# Patient Record
Sex: FEMALE | Race: WHITE | Employment: FULL TIME | URBAN - METROPOLITAN AREA
[De-identification: names, ages, dates, MRNs, and addresses within clinical notes are randomized per-mention and may not be internally consistent; named-entity substitution may affect disease eponyms.]

---

## 2018-03-12 ENCOUNTER — OFFICE VISIT (OUTPATIENT)
Dept: PRIMARY CARE CLINIC | Age: 48
End: 2018-03-12
Payer: COMMERCIAL

## 2018-03-12 VITALS
WEIGHT: 138 LBS | OXYGEN SATURATION: 98 % | BODY MASS INDEX: 20.92 KG/M2 | HEIGHT: 68 IN | DIASTOLIC BLOOD PRESSURE: 72 MMHG | HEART RATE: 62 BPM | SYSTOLIC BLOOD PRESSURE: 128 MMHG | TEMPERATURE: 97.7 F

## 2018-03-12 DIAGNOSIS — J32.4 CHRONIC PANSINUSITIS: Primary | ICD-10-CM

## 2018-03-12 PROCEDURE — G8427 DOCREV CUR MEDS BY ELIG CLIN: HCPCS | Performed by: FAMILY MEDICINE

## 2018-03-12 PROCEDURE — 1036F TOBACCO NON-USER: CPT | Performed by: FAMILY MEDICINE

## 2018-03-12 PROCEDURE — 99213 OFFICE O/P EST LOW 20 MIN: CPT | Performed by: FAMILY MEDICINE

## 2018-03-12 PROCEDURE — G8420 CALC BMI NORM PARAMETERS: HCPCS | Performed by: FAMILY MEDICINE

## 2018-03-12 PROCEDURE — G8484 FLU IMMUNIZE NO ADMIN: HCPCS | Performed by: FAMILY MEDICINE

## 2018-03-12 RX ORDER — FLUTICASONE PROPIONATE 50 MCG
2 SPRAY, SUSPENSION (ML) NASAL DAILY
Qty: 1 BOTTLE | Refills: 1 | Status: SHIPPED | OUTPATIENT
Start: 2018-03-12 | End: 2018-05-14 | Stop reason: SDUPTHER

## 2018-03-12 ASSESSMENT — PATIENT HEALTH QUESTIONNAIRE - PHQ9
2. FEELING DOWN, DEPRESSED OR HOPELESS: 0
1. LITTLE INTEREST OR PLEASURE IN DOING THINGS: 0
SUM OF ALL RESPONSES TO PHQ9 QUESTIONS 1 & 2: 0
SUM OF ALL RESPONSES TO PHQ QUESTIONS 1-9: 0

## 2018-03-12 ASSESSMENT — ENCOUNTER SYMPTOMS
SORE THROAT: 1
SINUS PRESSURE: 1
SINUS COMPLAINT: 1
SWOLLEN GLANDS: 1
COUGH: 1
SHORTNESS OF BREATH: 1

## 2018-03-12 NOTE — PROGRESS NOTES
tenderness. Left sinus exhibits maxillary sinus tenderness. Left sinus exhibits no frontal sinus tenderness. Mouth/Throat: Uvula is midline, oropharynx is clear and moist and mucous membranes are normal. No oropharyngeal exudate or posterior oropharyngeal erythema. Neck: Neck supple. Cardiovascular: Normal rate and normal heart sounds. Pulmonary/Chest: Effort normal and breath sounds normal.   Lymphadenopathy:     She has no cervical adenopathy. Vitals reviewed. ASSESSMENT AND PLAN:    Susan Bowens was seen today for sinus problem. Diagnoses and all orders for this visit:    Chronic pansinusitis  -     fluticasone (FLONASE) 50 MCG/ACT nasal spray; 2 sprays by Nasal route daily  -     CT Sinus WO Contrast; Future        Return for based on results.     Charissa Solomon, DO

## 2018-03-19 ENCOUNTER — TELEPHONE (OUTPATIENT)
Dept: PRIMARY CARE CLINIC | Age: 48
End: 2018-03-19

## 2018-03-20 ENCOUNTER — TELEPHONE (OUTPATIENT)
Dept: PRIMARY CARE CLINIC | Age: 48
End: 2018-03-20

## 2018-03-21 RX ORDER — METHYLPREDNISOLONE 4 MG/1
TABLET ORAL
Qty: 1 KIT | Refills: 0 | Status: SHIPPED | OUTPATIENT
Start: 2018-03-21 | End: 2018-04-19 | Stop reason: ALTCHOICE

## 2018-03-26 DIAGNOSIS — J32.4 CHRONIC PANSINUSITIS: ICD-10-CM

## 2018-03-27 ENCOUNTER — TELEPHONE (OUTPATIENT)
Dept: PRIMARY CARE CLINIC | Age: 48
End: 2018-03-27

## 2018-03-27 DIAGNOSIS — J32.9 CHRONIC SINUSITIS, UNSPECIFIED LOCATION: ICD-10-CM

## 2018-03-27 DIAGNOSIS — J34.2 NASAL SEPTAL DEVIATION: Primary | ICD-10-CM

## 2018-03-29 ENCOUNTER — TELEPHONE (OUTPATIENT)
Dept: PRIMARY CARE CLINIC | Age: 48
End: 2018-03-29

## 2018-03-29 DIAGNOSIS — J34.2 NASAL SEPTAL DEVIATION: Primary | ICD-10-CM

## 2018-04-19 ENCOUNTER — OFFICE VISIT (OUTPATIENT)
Dept: PRIMARY CARE CLINIC | Age: 48
End: 2018-04-19
Payer: COMMERCIAL

## 2018-04-19 VITALS
WEIGHT: 139 LBS | DIASTOLIC BLOOD PRESSURE: 80 MMHG | SYSTOLIC BLOOD PRESSURE: 122 MMHG | OXYGEN SATURATION: 98 % | BODY MASS INDEX: 21.13 KG/M2 | HEART RATE: 86 BPM | TEMPERATURE: 97.5 F

## 2018-04-19 DIAGNOSIS — J32.4 CHRONIC PANSINUSITIS: Primary | ICD-10-CM

## 2018-04-19 PROCEDURE — G8420 CALC BMI NORM PARAMETERS: HCPCS | Performed by: FAMILY MEDICINE

## 2018-04-19 PROCEDURE — G8427 DOCREV CUR MEDS BY ELIG CLIN: HCPCS | Performed by: FAMILY MEDICINE

## 2018-04-19 PROCEDURE — 1036F TOBACCO NON-USER: CPT | Performed by: FAMILY MEDICINE

## 2018-04-19 PROCEDURE — 99213 OFFICE O/P EST LOW 20 MIN: CPT | Performed by: FAMILY MEDICINE

## 2018-04-19 RX ORDER — DOXYCYCLINE HYCLATE 100 MG
100 TABLET ORAL 2 TIMES DAILY
Qty: 20 TABLET | Refills: 0 | Status: SHIPPED | OUTPATIENT
Start: 2018-04-19 | End: 2018-04-29

## 2018-04-19 ASSESSMENT — ENCOUNTER SYMPTOMS
SINUS PRESSURE: 1
SORE THROAT: 1
RHINORRHEA: 1
COUGH: 1
CHOKING: 1
SINUS PAIN: 1

## 2018-08-16 ENCOUNTER — OFFICE VISIT (OUTPATIENT)
Dept: PRIMARY CARE CLINIC | Age: 48
End: 2018-08-16
Payer: COMMERCIAL

## 2018-08-16 VITALS
SYSTOLIC BLOOD PRESSURE: 122 MMHG | DIASTOLIC BLOOD PRESSURE: 76 MMHG | BODY MASS INDEX: 20.68 KG/M2 | HEART RATE: 80 BPM | WEIGHT: 136 LBS | TEMPERATURE: 97 F | OXYGEN SATURATION: 97 %

## 2018-08-16 DIAGNOSIS — N39.0 URINARY TRACT INFECTION WITHOUT HEMATURIA, SITE UNSPECIFIED: Primary | ICD-10-CM

## 2018-08-16 LAB
BILIRUBIN, POC: NORMAL
BLOOD URINE, POC: NORMAL
CLARITY, POC: CLEAR
COLOR, POC: YELLOW
GLUCOSE URINE, POC: NORMAL
KETONES, POC: NORMAL
LEUKOCYTE EST, POC: NORMAL
NITRITE, POC: NORMAL
PH, POC: 5
PROTEIN, POC: NORMAL
SPECIFIC GRAVITY, POC: 1
UROBILINOGEN, POC: 0.2

## 2018-08-16 PROCEDURE — G8427 DOCREV CUR MEDS BY ELIG CLIN: HCPCS | Performed by: FAMILY MEDICINE

## 2018-08-16 PROCEDURE — G8420 CALC BMI NORM PARAMETERS: HCPCS | Performed by: FAMILY MEDICINE

## 2018-08-16 PROCEDURE — 81002 URINALYSIS NONAUTO W/O SCOPE: CPT | Performed by: FAMILY MEDICINE

## 2018-08-16 PROCEDURE — 1036F TOBACCO NON-USER: CPT | Performed by: FAMILY MEDICINE

## 2018-08-16 PROCEDURE — 99213 OFFICE O/P EST LOW 20 MIN: CPT | Performed by: FAMILY MEDICINE

## 2018-08-16 RX ORDER — SULFAMETHOXAZOLE AND TRIMETHOPRIM 800; 160 MG/1; MG/1
1 TABLET ORAL 2 TIMES DAILY
Qty: 10 TABLET | Refills: 0 | Status: SHIPPED | OUTPATIENT
Start: 2018-08-16 | End: 2018-08-21

## 2018-08-16 ASSESSMENT — ENCOUNTER SYMPTOMS
DIARRHEA: 0
CONSTIPATION: 0
ABDOMINAL DISTENTION: 1

## 2018-08-16 NOTE — PROGRESS NOTES
has no wheezes. She has no rales. Abdominal: Soft. Bowel sounds are normal. She exhibits no mass. There is tenderness in the suprapubic area and left lower quadrant. There is CVA tenderness (left). There is no rebound and no guarding. Musculoskeletal: Normal range of motion. She exhibits no edema. Lymphadenopathy:     She has no cervical adenopathy. Neurological: She is alert and oriented to person, place, and time. Skin: Skin is warm and dry. Psychiatric: She has a normal mood and affect. Vitals reviewed. ASSESSMENT AND PLAN:    Lorie Moulton was seen today for urinary tract infection. Diagnoses and all orders for this visit:    Urinary tract infection without hematuria, site unspecified  -     POCT Urinalysis no Micro  -     sulfamethoxazole-trimethoprim (BACTRIM DS) 800-160 MG per tablet; Take 1 tablet by mouth 2 times daily for 5 days    Results for Josh Webber (MRN <[id number]>) as of 8/16/2018 14:10   Ref. Range 8/16/2018 14:02   Protein, UA Unknown neg   Color, UA Unknown yellow   Clarity, UA Unknown clear   Glucose, UA POC Unknown neg   Bilirubin, UA Unknown neg   Ketones, UA Unknown neg   Spec Grav, UA Unknown 1.005   pH, UA Unknown 5.0   Urobilinogen, UA Unknown 0.2   Nitrite, UA Unknown neg   Leukocytes, UA Unknown neg   Blood, UA POC Unknown neg     - advil or tylenol prn pain  - keep pushing fluids    Return if symptoms worsen or fail to improve.     Oc Solomon,

## 2019-02-21 ENCOUNTER — OFFICE VISIT (OUTPATIENT)
Dept: PRIMARY CARE CLINIC | Age: 49
End: 2019-02-21
Payer: COMMERCIAL

## 2019-02-21 ENCOUNTER — TELEPHONE (OUTPATIENT)
Dept: PRIMARY CARE CLINIC | Age: 49
End: 2019-02-21

## 2019-02-21 VITALS
WEIGHT: 131.5 LBS | OXYGEN SATURATION: 98 % | BODY MASS INDEX: 19.93 KG/M2 | TEMPERATURE: 97.9 F | DIASTOLIC BLOOD PRESSURE: 80 MMHG | HEIGHT: 68 IN | SYSTOLIC BLOOD PRESSURE: 128 MMHG | HEART RATE: 82 BPM

## 2019-02-21 DIAGNOSIS — J01.00 ACUTE NON-RECURRENT MAXILLARY SINUSITIS: Primary | ICD-10-CM

## 2019-02-21 DIAGNOSIS — S39.012A STRAIN OF LUMBAR PARASPINOUS MUSCLE, INITIAL ENCOUNTER: ICD-10-CM

## 2019-02-21 PROCEDURE — 99214 OFFICE O/P EST MOD 30 MIN: CPT | Performed by: FAMILY MEDICINE

## 2019-02-21 PROCEDURE — G8484 FLU IMMUNIZE NO ADMIN: HCPCS | Performed by: FAMILY MEDICINE

## 2019-02-21 PROCEDURE — G8427 DOCREV CUR MEDS BY ELIG CLIN: HCPCS | Performed by: FAMILY MEDICINE

## 2019-02-21 PROCEDURE — 1036F TOBACCO NON-USER: CPT | Performed by: FAMILY MEDICINE

## 2019-02-21 PROCEDURE — G8420 CALC BMI NORM PARAMETERS: HCPCS | Performed by: FAMILY MEDICINE

## 2019-02-21 RX ORDER — CEFDINIR 300 MG/1
300 CAPSULE ORAL 2 TIMES DAILY
Qty: 20 CAPSULE | Refills: 0 | Status: SHIPPED | OUTPATIENT
Start: 2019-02-21 | End: 2019-03-03

## 2019-02-21 RX ORDER — CHLORAL HYDRATE 500 MG
1 CAPSULE ORAL
COMMUNITY

## 2019-02-21 RX ORDER — METHYLPREDNISOLONE 4 MG/1
TABLET ORAL
Qty: 1 KIT | Refills: 0 | Status: SHIPPED | OUTPATIENT
Start: 2019-02-21 | End: 2019-04-18

## 2019-02-21 ASSESSMENT — ENCOUNTER SYMPTOMS
SINUS COMPLAINT: 1
SINUS PRESSURE: 1
SORE THROAT: 1
SHORTNESS OF BREATH: 1

## 2019-02-21 ASSESSMENT — PATIENT HEALTH QUESTIONNAIRE - PHQ9
SUM OF ALL RESPONSES TO PHQ9 QUESTIONS 1 & 2: 0
1. LITTLE INTEREST OR PLEASURE IN DOING THINGS: 0
SUM OF ALL RESPONSES TO PHQ QUESTIONS 1-9: 0
2. FEELING DOWN, DEPRESSED OR HOPELESS: 0
SUM OF ALL RESPONSES TO PHQ QUESTIONS 1-9: 0

## 2019-04-18 ENCOUNTER — OFFICE VISIT (OUTPATIENT)
Dept: PRIMARY CARE CLINIC | Age: 49
End: 2019-04-18
Payer: COMMERCIAL

## 2019-04-18 VITALS
BODY MASS INDEX: 20.07 KG/M2 | SYSTOLIC BLOOD PRESSURE: 130 MMHG | HEART RATE: 84 BPM | DIASTOLIC BLOOD PRESSURE: 80 MMHG | WEIGHT: 132 LBS | TEMPERATURE: 98.2 F | OXYGEN SATURATION: 97 %

## 2019-04-18 DIAGNOSIS — T75.3XXS CAR SICKNESS, SEQUELA: ICD-10-CM

## 2019-04-18 DIAGNOSIS — B96.89 ACUTE BACTERIAL SINUSITIS: Primary | ICD-10-CM

## 2019-04-18 DIAGNOSIS — J01.90 ACUTE BACTERIAL SINUSITIS: Primary | ICD-10-CM

## 2019-04-18 PROCEDURE — 99213 OFFICE O/P EST LOW 20 MIN: CPT | Performed by: FAMILY MEDICINE

## 2019-04-18 PROCEDURE — G8427 DOCREV CUR MEDS BY ELIG CLIN: HCPCS | Performed by: FAMILY MEDICINE

## 2019-04-18 PROCEDURE — 1036F TOBACCO NON-USER: CPT | Performed by: FAMILY MEDICINE

## 2019-04-18 PROCEDURE — G8420 CALC BMI NORM PARAMETERS: HCPCS | Performed by: FAMILY MEDICINE

## 2019-04-18 RX ORDER — DOXYCYCLINE HYCLATE 100 MG
100 TABLET ORAL 2 TIMES DAILY
Qty: 20 TABLET | Refills: 0 | Status: SHIPPED | OUTPATIENT
Start: 2019-04-18 | End: 2019-04-28

## 2019-04-18 RX ORDER — SCOLOPAMINE TRANSDERMAL SYSTEM 1 MG/1
1 PATCH, EXTENDED RELEASE TRANSDERMAL
Qty: 4 PATCH | Refills: 11 | Status: SHIPPED | OUTPATIENT
Start: 2019-04-18 | End: 2020-04-17

## 2019-04-18 ASSESSMENT — ENCOUNTER SYMPTOMS
SINUS COMPLAINT: 1
SORE THROAT: 1
SINUS PRESSURE: 1
COUGH: 1
RHINORRHEA: 0
SINUS PAIN: 1
SWOLLEN GLANDS: 1

## 2019-04-18 NOTE — PROGRESS NOTES
Jaye Velazquez, a female of 52 y.o. came to the office 4/18/2019. There is no problem list on file for this patient. Headache    Associated symptoms include coughing (slight), ear pain (when swallows.), sinus pressure (in cheeks), a sore throat and swollen glands. Pertinent negatives include no fever or rhinorrhea. Sinus Problem   This is a new problem. The current episode started in the past 7 days (4/15). The problem has been gradually worsening since onset. There has been no fever. Associated symptoms include coughing (slight), ear pain (when swallows.), headaches (at first in frontal area and in cheeks.), sinus pressure (in cheeks), a sore throat and swollen glands. Pertinent negatives include no chills, congestion or sneezing. Treatments tried: Mucinex and Nyquil  The treatment provided no relief. car sickness: going on cruise and wants something for this due to her hisotry of car sickness. Ext hemorrhoid: history of this and wants it removed. She wants to make her own appt. Allergies   Allergen Reactions    Levaquin [Levofloxacin In D5w] Other (See Comments)     Nightmares       Current Outpatient Medications on File Prior to Visit   Medication Sig Dispense Refill    Omega-3 Fatty Acids (FISH OIL) 1000 MG CAPS Take 1 capsule by mouth      Multiple Vitamins-Minerals (MULTIVITAMIN ADULT PO) Take 1 capsule by mouth      fluticasone (FLONASE) 50 MCG/ACT nasal spray INHALE 2 SPRAYS INTO EACH NOSTRIL EVERY DAY 1 Bottle 2    Fexofenadine HCl (ALLEGRA PO) Take by mouth      ALPRAZolam (XANAX) 0.25 MG tablet Take 1 tablet by mouth daily as needed for Anxiety 20 tablet 0     No current facility-administered medications on file prior to visit. Review of Systems   Constitutional: Negative for chills and fever. HENT: Positive for ear pain (when swallows.), postnasal drip, sinus pressure (in cheeks), sinus pain and sore throat. Negative for congestion, rhinorrhea and sneezing.

## 2019-05-21 ENCOUNTER — OFFICE VISIT (OUTPATIENT)
Dept: PRIMARY CARE CLINIC | Age: 49
End: 2019-05-21
Payer: COMMERCIAL

## 2019-05-21 VITALS
TEMPERATURE: 97.5 F | BODY MASS INDEX: 20.07 KG/M2 | HEART RATE: 91 BPM | SYSTOLIC BLOOD PRESSURE: 110 MMHG | WEIGHT: 132 LBS | DIASTOLIC BLOOD PRESSURE: 80 MMHG | OXYGEN SATURATION: 100 %

## 2019-05-21 DIAGNOSIS — J32.4 CHRONIC PANSINUSITIS: Primary | ICD-10-CM

## 2019-05-21 PROCEDURE — 1036F TOBACCO NON-USER: CPT | Performed by: FAMILY MEDICINE

## 2019-05-21 PROCEDURE — G8427 DOCREV CUR MEDS BY ELIG CLIN: HCPCS | Performed by: FAMILY MEDICINE

## 2019-05-21 PROCEDURE — 99213 OFFICE O/P EST LOW 20 MIN: CPT | Performed by: FAMILY MEDICINE

## 2019-05-21 PROCEDURE — G8420 CALC BMI NORM PARAMETERS: HCPCS | Performed by: FAMILY MEDICINE

## 2019-05-21 RX ORDER — AMOXICILLIN 500 MG/1
500 CAPSULE ORAL 3 TIMES DAILY
Qty: 30 CAPSULE | Refills: 0 | Status: SHIPPED | OUTPATIENT
Start: 2019-05-21 | End: 2019-05-31

## 2019-05-21 ASSESSMENT — ENCOUNTER SYMPTOMS
COUGH: 1
SINUS PRESSURE: 1
SORE THROAT: 1
RHINORRHEA: 0
SINUS PAIN: 1
SHORTNESS OF BREATH: 0

## 2019-05-21 NOTE — PROGRESS NOTES
Sharon Gooden, a female of 52 y.o. came to the office 5/21/2019. There is no problem list on file for this patient. Sinusitis   This is a new problem. The current episode started in the past 7 days. The problem has been gradually worsening since onset. There has been no fever. Associated symptoms include chills, congestion, coughing (began last pm), ear pain, headaches (for 1 wk), sinus pressure and a sore throat. Pertinent negatives include no shortness of breath. Past treatments include oral decongestants (flonase, allegra. ). The treatment provided no relief. Allergies   Allergen Reactions    Levaquin [Levofloxacin In D5w] Other (See Comments)     Nightmares       Current Outpatient Medications on File Prior to Visit   Medication Sig Dispense Refill    scopolamine (TRANSDERM-SCOP, 1.5 MG,) transdermal patch Place 1 patch onto the skin every 72 hours 4 patch 11    Omega-3 Fatty Acids (FISH OIL) 1000 MG CAPS Take 1 capsule by mouth      Multiple Vitamins-Minerals (MULTIVITAMIN ADULT PO) Take 1 capsule by mouth      fluticasone (FLONASE) 50 MCG/ACT nasal spray INHALE 2 SPRAYS INTO EACH NOSTRIL EVERY DAY 1 Bottle 2    Fexofenadine HCl (ALLEGRA PO) Take by mouth      ALPRAZolam (XANAX) 0.25 MG tablet Take 1 tablet by mouth daily as needed for Anxiety 20 tablet 0     No current facility-administered medications on file prior to visit. Review of Systems   Constitutional: Positive for chills and fatigue. Negative for fever. HENT: Positive for congestion, ear pain, postnasal drip, sinus pressure, sinus pain and sore throat. Negative for rhinorrhea. Respiratory: Positive for cough (began last pm). Negative for shortness of breath. Neurological: Positive for headaches (for 1 wk).      All other review of systems reviewed and are negative    OBJECTIVE:  /80   Pulse 91   Temp 97.5 °F (36.4 °C)   Wt 132 lb (59.9 kg)   SpO2 100%   BMI 20.07 kg/m²      Physical Exam Constitutional: No distress. HENT:   Right Ear: Tympanic membrane normal.   Left Ear: Tympanic membrane normal.   Nose: No mucosal edema or rhinorrhea. Right sinus exhibits maxillary sinus tenderness and frontal sinus tenderness. Left sinus exhibits maxillary sinus tenderness and frontal sinus tenderness. Mouth/Throat: Uvula is midline, oropharynx is clear and moist and mucous membranes are normal. No oropharyngeal exudate or posterior oropharyngeal erythema. Neck: Neck supple. Cardiovascular: Normal rate and normal heart sounds. Pulmonary/Chest: Effort normal and breath sounds normal.   Lymphadenopathy:     She has no cervical adenopathy. Vitals reviewed. ASSESSMENT AND PLAN:    Shan Mcgowan was seen today for sinusitis and congestion. Diagnoses and all orders for this visit:    Chronic pansinusitis  -     amoxicillin (AMOXIL) 500 MG capsule; Take 1 capsule by mouth 3 times daily for 10 days    - ok to continue otc meds. Return if symptoms worsen or fail to improve.     Paige Good Economus, DO

## 2019-07-23 ENCOUNTER — OFFICE VISIT (OUTPATIENT)
Dept: PRIMARY CARE CLINIC | Age: 49
End: 2019-07-23
Payer: COMMERCIAL

## 2019-07-23 VITALS
DIASTOLIC BLOOD PRESSURE: 80 MMHG | HEART RATE: 88 BPM | SYSTOLIC BLOOD PRESSURE: 122 MMHG | OXYGEN SATURATION: 96 % | WEIGHT: 131 LBS | TEMPERATURE: 97.7 F | BODY MASS INDEX: 19.92 KG/M2
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
## 2019-07-23 PROCEDURE — G8427 DOCREV CUR MEDS BY ELIG CLIN: HCPCS | Performed by: PHYSICIAN ASSISTANT

## 2019-07-23 PROCEDURE — 96372 THER/PROPH/DIAG INJ SC/IM: CPT | Performed by: PHYSICIAN ASSISTANT

## 2019-07-23 PROCEDURE — 99213 OFFICE O/P EST LOW 20 MIN: CPT | Performed by: PHYSICIAN ASSISTANT

## 2019-07-23 PROCEDURE — 1036F TOBACCO NON-USER: CPT | Performed by: PHYSICIAN ASSISTANT

## 2019-07-23 PROCEDURE — G8420 CALC BMI NORM PARAMETERS: HCPCS | Performed by: PHYSICIAN ASSISTANT

## 2019-07-23 RX ORDER — METHYLPREDNISOLONE ACETATE 80 MG/ML
80 INJECTION, SUSPENSION INTRA-ARTICULAR; INTRALESIONAL; INTRAMUSCULAR; SOFT TISSUE ONCE
Status: COMPLETED | OUTPATIENT
Start: 2019-07-23 | End: 2019-07-23

## 2019-07-23 RX ORDER — TRIAMCINOLONE ACETONIDE 1 MG/G
CREAM TOPICAL
Qty: 15 G | Refills: 0 | Status: SHIPPED
Start: 2019-07-23 | End: 2020-09-08 | Stop reason: SDUPTHER

## 2019-07-23 RX ORDER — HYDROXYZINE HYDROCHLORIDE 25 MG/1
TABLET, FILM COATED ORAL
Qty: 30 TABLET | Refills: 0 | Status: SHIPPED
Start: 2019-07-23 | End: 2020-09-11

## 2019-07-23 RX ADMIN — METHYLPREDNISOLONE ACETATE 80 MG: 80 INJECTION, SUSPENSION INTRA-ARTICULAR; INTRALESIONAL; INTRAMUSCULAR; SOFT TISSUE at 12:07

## 2019-08-27 ENCOUNTER — HOSPITAL ENCOUNTER (OUTPATIENT)
Age: 49
Discharge: HOME | End: 2019-08-29
Payer: COMMERCIAL

## 2019-08-27 PROCEDURE — 88305 TISSUE EXAM BY PATHOLOGIST: CPT

## 2019-12-16 ENCOUNTER — OFFICE VISIT (OUTPATIENT)
Dept: FAMILY MEDICINE CLINIC | Age: 49
End: 2019-12-16
Payer: COMMERCIAL

## 2019-12-16 VITALS
WEIGHT: 129 LBS | OXYGEN SATURATION: 97 % | BODY MASS INDEX: 19.61 KG/M2 | DIASTOLIC BLOOD PRESSURE: 64 MMHG | HEART RATE: 89 BPM | TEMPERATURE: 97.6 F | SYSTOLIC BLOOD PRESSURE: 102 MMHG | RESPIRATION RATE: 18 BRPM

## 2019-12-16 DIAGNOSIS — K52.9 ACUTE GASTROENTERITIS: ICD-10-CM

## 2019-12-16 DIAGNOSIS — R05.9 COUGH: ICD-10-CM

## 2019-12-16 DIAGNOSIS — R52 BODY ACHES: Primary | ICD-10-CM

## 2019-12-16 LAB
INFLUENZA A ANTIBODY: NORMAL
INFLUENZA B ANTIBODY: NORMAL

## 2019-12-16 PROCEDURE — 99213 OFFICE O/P EST LOW 20 MIN: CPT | Performed by: FAMILY MEDICINE

## 2019-12-16 PROCEDURE — G8484 FLU IMMUNIZE NO ADMIN: HCPCS | Performed by: FAMILY MEDICINE

## 2019-12-16 PROCEDURE — G8427 DOCREV CUR MEDS BY ELIG CLIN: HCPCS | Performed by: FAMILY MEDICINE

## 2019-12-16 PROCEDURE — 1036F TOBACCO NON-USER: CPT | Performed by: FAMILY MEDICINE

## 2019-12-16 PROCEDURE — 87804 INFLUENZA ASSAY W/OPTIC: CPT | Performed by: FAMILY MEDICINE

## 2019-12-16 PROCEDURE — G8420 CALC BMI NORM PARAMETERS: HCPCS | Performed by: FAMILY MEDICINE

## 2019-12-16 RX ORDER — AZITHROMYCIN 250 MG/1
TABLET, FILM COATED ORAL
Qty: 1 PACKET | Refills: 0 | Status: SHIPPED | OUTPATIENT
Start: 2019-12-16 | End: 2020-01-04

## 2019-12-16 ASSESSMENT — ENCOUNTER SYMPTOMS
VOMITING: 1
SINUS PAIN: 1
COUGH: 1
ALLERGIC/IMMUNOLOGIC NEGATIVE: 1
EYES NEGATIVE: 1
NAUSEA: 1

## 2020-01-04 ENCOUNTER — OFFICE VISIT (OUTPATIENT)
Dept: FAMILY MEDICINE CLINIC | Age: 50
End: 2020-01-04
Payer: COMMERCIAL

## 2020-01-04 VITALS
HEIGHT: 68 IN | BODY MASS INDEX: 19.85 KG/M2 | HEART RATE: 77 BPM | TEMPERATURE: 97.1 F | SYSTOLIC BLOOD PRESSURE: 97 MMHG | OXYGEN SATURATION: 96 % | DIASTOLIC BLOOD PRESSURE: 64 MMHG | RESPIRATION RATE: 18 BRPM | WEIGHT: 131 LBS

## 2020-01-04 PROBLEM — B96.89 ACUTE BACTERIAL SINUSITIS: Status: ACTIVE | Noted: 2020-01-04

## 2020-01-04 PROBLEM — J01.90 ACUTE BACTERIAL SINUSITIS: Status: ACTIVE | Noted: 2020-01-04

## 2020-01-04 PROBLEM — B00.1 COLD SORE: Status: ACTIVE | Noted: 2020-01-04

## 2020-01-04 PROCEDURE — 99213 OFFICE O/P EST LOW 20 MIN: CPT | Performed by: FAMILY MEDICINE

## 2020-01-04 PROCEDURE — G8420 CALC BMI NORM PARAMETERS: HCPCS | Performed by: FAMILY MEDICINE

## 2020-01-04 PROCEDURE — G8427 DOCREV CUR MEDS BY ELIG CLIN: HCPCS | Performed by: FAMILY MEDICINE

## 2020-01-04 PROCEDURE — 1036F TOBACCO NON-USER: CPT | Performed by: FAMILY MEDICINE

## 2020-01-04 PROCEDURE — G8484 FLU IMMUNIZE NO ADMIN: HCPCS | Performed by: FAMILY MEDICINE

## 2020-01-04 RX ORDER — VALACYCLOVIR HYDROCHLORIDE 1 G/1
1000 TABLET, FILM COATED ORAL 3 TIMES DAILY
Qty: 21 TABLET | Refills: 0 | Status: SHIPPED | OUTPATIENT
Start: 2020-01-04 | End: 2020-01-11

## 2020-01-04 RX ORDER — CEFDINIR 300 MG/1
300 CAPSULE ORAL 2 TIMES DAILY
Qty: 14 CAPSULE | Refills: 0 | Status: SHIPPED | OUTPATIENT
Start: 2020-01-04 | End: 2020-01-11

## 2020-01-04 RX ORDER — METHYLPREDNISOLONE 4 MG/1
TABLET ORAL
Qty: 1 KIT | Refills: 0 | Status: SHIPPED
Start: 2020-01-04 | End: 2020-03-13

## 2020-01-04 ASSESSMENT — ENCOUNTER SYMPTOMS
SORE THROAT: 1
COUGH: 1
SWOLLEN GLANDS: 1
SINUS PRESSURE: 1

## 2020-01-04 NOTE — PROGRESS NOTES
risks and complications, especially if left untreated. Counseled regarding the possible side effects, risks, benefits and alternatives to treatment; patient and/or guardian verbalizes understanding, agrees, and wishes to proceed with above treatment plan. Call or go to ED immediately if symptoms worsen or persist. Advised patient to call with any new medication issues. .     As applicable, educational materials and/or home exercises printed for patient's review and were included in patient instructions on his/her After Visit Summary and given to patient at the end of visit. Patient and/or guardian given opportunity to ask questions/raise concerns. The patient verbalized comfort and understanding ofinstructions. Haider Trejo D.O.   12:33 PM  1/4/2020       This document may have been prepared at least partially through the use of voice recognition software. Although effort is taken to assure the accuracy of this document, it is possible that grammatical, syntax,  or spelling errors may occur.

## 2020-01-15 PROBLEM — R05.9 COUGH: Status: RESOLVED | Noted: 2019-12-16 | Resolved: 2020-01-15

## 2020-01-16 RX ORDER — FLUTICASONE PROPIONATE 50 MCG
SPRAY, SUSPENSION (ML) NASAL
Qty: 1 BOTTLE | Refills: 2 | Status: SHIPPED
Start: 2020-01-16 | End: 2020-04-17

## 2020-03-13 ENCOUNTER — OFFICE VISIT (OUTPATIENT)
Dept: PRIMARY CARE CLINIC | Age: 50
End: 2020-03-13
Payer: COMMERCIAL

## 2020-03-13 VITALS
BODY MASS INDEX: 19.77 KG/M2 | SYSTOLIC BLOOD PRESSURE: 116 MMHG | OXYGEN SATURATION: 98 % | DIASTOLIC BLOOD PRESSURE: 64 MMHG | HEART RATE: 72 BPM | WEIGHT: 130 LBS | TEMPERATURE: 97.4 F

## 2020-03-13 PROCEDURE — 1036F TOBACCO NON-USER: CPT | Performed by: FAMILY MEDICINE

## 2020-03-13 PROCEDURE — 99213 OFFICE O/P EST LOW 20 MIN: CPT | Performed by: FAMILY MEDICINE

## 2020-03-13 PROCEDURE — 3017F COLORECTAL CA SCREEN DOC REV: CPT | Performed by: FAMILY MEDICINE

## 2020-03-13 PROCEDURE — G8420 CALC BMI NORM PARAMETERS: HCPCS | Performed by: FAMILY MEDICINE

## 2020-03-13 PROCEDURE — G8427 DOCREV CUR MEDS BY ELIG CLIN: HCPCS | Performed by: FAMILY MEDICINE

## 2020-03-13 PROCEDURE — G8484 FLU IMMUNIZE NO ADMIN: HCPCS | Performed by: FAMILY MEDICINE

## 2020-03-13 RX ORDER — AMOXICILLIN AND CLAVULANATE POTASSIUM 875; 125 MG/1; MG/1
1 TABLET, FILM COATED ORAL 2 TIMES DAILY
Qty: 20 TABLET | Refills: 0 | Status: SHIPPED | OUTPATIENT
Start: 2020-03-13 | End: 2020-03-23

## 2020-03-13 ASSESSMENT — PATIENT HEALTH QUESTIONNAIRE - PHQ9
SUM OF ALL RESPONSES TO PHQ QUESTIONS 1-9: 0
2. FEELING DOWN, DEPRESSED OR HOPELESS: 0
SUM OF ALL RESPONSES TO PHQ9 QUESTIONS 1 & 2: 0
SUM OF ALL RESPONSES TO PHQ QUESTIONS 1-9: 0
1. LITTLE INTEREST OR PLEASURE IN DOING THINGS: 0

## 2020-03-13 ASSESSMENT — ENCOUNTER SYMPTOMS
SHORTNESS OF BREATH: 0
SINUS PAIN: 0
WHEEZING: 0
SWOLLEN GLANDS: 0
SINUS PRESSURE: 1
SORE THROAT: 1

## 2020-04-17 RX ORDER — FLUTICASONE PROPIONATE 50 MCG
SPRAY, SUSPENSION (ML) NASAL
Qty: 1 BOTTLE | Refills: 2 | Status: SHIPPED
Start: 2020-04-17 | End: 2020-08-03

## 2020-05-07 ENCOUNTER — TELEPHONE (OUTPATIENT)
Dept: ADMINISTRATIVE | Age: 50
End: 2020-05-07

## 2020-05-07 NOTE — TELEPHONE ENCOUNTER
Patient says she has another sinus infection. I offered her vv for 5-8/20, she declined. I also offered her flu clinic on [facility]. She declined that too. She wants to know if she can get a rx like she had last time she had one without coming in. Please call her back. Thank you.

## 2020-05-08 ENCOUNTER — VIRTUAL VISIT (OUTPATIENT)
Dept: PRIMARY CARE CLINIC | Age: 50
End: 2020-05-08
Payer: COMMERCIAL

## 2020-05-08 PROCEDURE — 1036F TOBACCO NON-USER: CPT | Performed by: FAMILY MEDICINE

## 2020-05-08 PROCEDURE — G8427 DOCREV CUR MEDS BY ELIG CLIN: HCPCS | Performed by: FAMILY MEDICINE

## 2020-05-08 PROCEDURE — 3017F COLORECTAL CA SCREEN DOC REV: CPT | Performed by: FAMILY MEDICINE

## 2020-05-08 PROCEDURE — G8420 CALC BMI NORM PARAMETERS: HCPCS | Performed by: FAMILY MEDICINE

## 2020-05-08 PROCEDURE — 99213 OFFICE O/P EST LOW 20 MIN: CPT | Performed by: FAMILY MEDICINE

## 2020-05-08 RX ORDER — MONTELUKAST SODIUM 10 MG/1
10 TABLET ORAL NIGHTLY
Qty: 30 TABLET | Refills: 5 | Status: SHIPPED
Start: 2020-05-08 | End: 2020-09-11

## 2020-05-08 RX ORDER — AMOXICILLIN AND CLAVULANATE POTASSIUM 875; 125 MG/1; MG/1
1 TABLET, FILM COATED ORAL 2 TIMES DAILY
Qty: 14 TABLET | Refills: 0 | Status: SHIPPED | OUTPATIENT
Start: 2020-05-08 | End: 2020-05-15

## 2020-05-08 ASSESSMENT — ENCOUNTER SYMPTOMS
SINUS PRESSURE: 1
WHEEZING: 0
SHORTNESS OF BREATH: 0
SORE THROAT: 1
RHINORRHEA: 0
SINUS PAIN: 1
COUGH: 1

## 2020-08-03 ENCOUNTER — OFFICE VISIT (OUTPATIENT)
Dept: SURGERY | Age: 50
End: 2020-08-03
Payer: COMMERCIAL

## 2020-08-03 VITALS
TEMPERATURE: 97.6 F | OXYGEN SATURATION: 100 % | SYSTOLIC BLOOD PRESSURE: 145 MMHG | DIASTOLIC BLOOD PRESSURE: 89 MMHG | RESPIRATION RATE: 18 BRPM | BODY MASS INDEX: 19.25 KG/M2 | WEIGHT: 127 LBS | HEIGHT: 68 IN | HEART RATE: 87 BPM

## 2020-08-03 PROCEDURE — G8420 CALC BMI NORM PARAMETERS: HCPCS | Performed by: SURGERY

## 2020-08-03 PROCEDURE — 99243 OFF/OP CNSLTJ NEW/EST LOW 30: CPT | Performed by: SURGERY

## 2020-08-03 PROCEDURE — G8427 DOCREV CUR MEDS BY ELIG CLIN: HCPCS | Performed by: SURGERY

## 2020-08-03 RX ORDER — FLUTICASONE PROPIONATE 50 MCG
SPRAY, SUSPENSION (ML) NASAL
Qty: 1 BOTTLE | Refills: 2 | Status: SHIPPED
Start: 2020-08-03 | End: 2020-12-01 | Stop reason: SDUPTHER

## 2020-08-03 RX ORDER — SODIUM CHLORIDE 9 MG/ML
INJECTION, SOLUTION INTRAVENOUS CONTINUOUS
Status: CANCELLED | OUTPATIENT
Start: 2020-08-03

## 2020-08-03 NOTE — PROGRESS NOTES
General Surgery History and Physical    Patient's Name/Date of Birth: Joslyn Menchaca / 1970    Date: 8/3/2020    PCP: Ruben Velásquez DO    Referring Physician:   Aarti Doan  [phone]    CHIEF COMPLAINT:    Chief Complaint   Patient presents with    Mass     growth in rectum          HISTORY OF PRESENT ILLNESS:    Joslyn Menchaca is an 48 y.o. female who presents with a mass hear her rectum. She said it has been there at least a year. She said it bleeds and gets itchy. She thinks it is getting larger. She said it is getting in the way when she wipes and blood gets on the toilet paper. It is bright red. No nausea, vomiting, diarrhea, constipation. No changes in stool caliber. No bloody or black stools. No abdominal pain. No unintentional weight loss. No family history of colon cancer. The patient has a known history of: no known risk factors. The patient has never had a colonoscopy before. Past Medical History: History reviewed. No pertinent past medical history.      Past Surgical History:   Past Surgical History:   Procedure Laterality Date    DILATION AND CURETTAGE OF UTERUS  02/14/2013    FOR MISSED AB  9 WEEKS    [facility]    [facility]ILL[facility] S[facility]USOTOM[facility]  10/16/2018    [facility]    [facility]16/2018    [facility]    TONSILLECTOMY          Allergies: Levaquin [levofloxacin in d5w]     Medications:   Current Outpatient Medications   Medication Sig Dispense Refill    FOLIC ACID PO Take by mouth daily      ZINC PO Take by mouth daily      Ascorbic Acid (VITAMIN C PO) Take by mouth daily      VITAMIN D PO Take by mouth daily      montelukast (SINGULAIR) 10 MG tablet Take 1 tablet by mouth nightly 30 tablet 5    fluticasone (FLONASE) 50 MCG/ACT nasal spray INSTILL 2 SPRAYS INTO EACH NOSTRIL EVERY DAY 1 Bottle 2    Omega-3 Fatty Acids (FISH OIL) 1000 MG CAPS Take 1 capsule by mouth      Multiple Vitamins-Minerals (MULTIVITAMIN ADULT PO) Take 1 capsule by mouth      Fexofenadine HCl (ALLEGRA PO) Take by mouth      triamcinolone (KENALOG) 0.1 % cream Apply topically 2 times daily. (Patient not taking: Reported on 8/3/2020) 15 g 0    hydrOXYzine (ATARAX) 25 MG tablet Take 1-2 PO every 6-8 hours prn itching (Patient not taking: Reported on 8/3/2020) 30 tablet 0    ALPRAZolam (XANAX) 0.25 MG tablet Take 1 tablet by mouth daily as needed for Anxiety (Patient not taking: Reported on 8/3/2020) 20 tablet 0     No current facility-administered medications for this visit. Social History:   Social History     Tobacco Use    Smoking status: Former Smoker    Smokeless tobacco: Never Used    Tobacco comment: quit smoking jan. 2013   Substance Use Topics    Alcohol use: Yes     Comment: social        Family History: History reviewed. No pertinent family history. REVIEW OF SYSTEMS:    Constitutional: negative  Eyes: negative  Ears, nose, mouth, throat, and face: negative  Respiratory: negative  Cardiovascular: negative  Gastrointestinal: as in HPI  Genitourinary:negative  Integument/breast: negative  Hematologic/lymphatic: negative  Musculoskeletal:negative  Neurological: negative  Allergic/Immunologic: negative    PHYSICAL EXAM   BP (!) 145/89   Pulse 87   Temp 97.6 °F (36.4 °C) (Infrared)   Resp 18   Ht 5' 8\" (1.727 m)   Wt 127 lb (57.6 kg)   SpO2 100%   BMI 19.31 kg/m²     General appearance: alert, cooperative and in no acute distress. Eyes: Grossly normal   Lungs: clear to auscultation bilaterally  Heart: regular rate and rhythm  Abdomen:  soft, non-tender; bowel sounds normal; no masses,  no organomegaly  Skin: No skin abnormalities  Neurologic: Alert and oriented x 3. Grossly normal  Musculoskeletal: No clubbing cyanosis or edema.   Rectal:     ASSESSMENT AND PLAN:       Assessment: Patel Mercer is an 48 y.o. female who presents for a colonoscopy with external hemorrhoid    Plan: I will set the patient up for a colonoscopy, possible biopsy, possible polypectomy. I explained the risks including but not limited to bleeding, perforation leading to possible surgery, or infection. The benefits, alternatives, and potential complications associated with the above procedure to be performed and transfusions when applicable with the patient/responsible person prior to the procedure. I discussed the risk of bowel peroration, postoperative bleeding, post-polypectomy syndrome, as well as the possibility of needing emergency surgery or another colonoscopy. All of the patient's questions were answered. The patient understands and agrees to the procedure. Excision of external hemorrhoid - can possibly do this at the time of the colonoscopy. Will assess at that time.     Physician Signature: Electronically signed by Maryann Dominique MD, General Surgery    Send copy of H&P to PCP, Cally Zhong DO and referring physician, Moris Ashton*

## 2020-08-04 ENCOUNTER — TELEPHONE (OUTPATIENT)
Dept: SURGERY | Age: 50
End: 2020-08-04

## 2020-08-25 ENCOUNTER — HOSPITAL ENCOUNTER (OUTPATIENT)
Age: 50
Discharge: HOME | End: 2020-08-27
Payer: COMMERCIAL

## 2020-08-25 PROCEDURE — 88305 TISSUE EXAM BY PATHOLOGIST: CPT

## 2020-09-08 RX ORDER — TRIAMCINOLONE ACETONIDE 1 MG/G
CREAM TOPICAL
Qty: 15 G | Refills: 0 | Status: SHIPPED
Start: 2020-09-08 | End: 2020-09-11

## 2020-09-11 ENCOUNTER — HOSPITAL ENCOUNTER (OUTPATIENT)
Age: 50
Discharge: HOME | End: 2020-09-11
Payer: COMMERCIAL

## 2020-09-11 PROCEDURE — U0003 INFECTIOUS AGENT DETECTION BY NUCLEIC ACID (DNA OR RNA); SEVERE ACUTE RESPIRATORY SYNDROME CORONAVIRUS 2 (SARS-COV-2) (CORONAVIRUS DISEASE [COVID-19]), AMPLIFIED PROBE TECHNIQUE, MAKING USE OF HIGH THROUGHPUT TECHNOLOGIES AS DESCRIBED BY CMS-2020-01-R: HCPCS

## 2020-09-11 NOTE — PROGRESS NOTES
[facility] PRE-ADMISSION TESTING INSTRUCTIONS    The Preadmission Testing patient is instructed accordingly using the following criteria (check applicable):    ARRIVAL INSTRUCTIONS:  [x] Parking the day of Surgery is located in the Main Entrance lot. Upon entering the door, make an immediate right to the surgery reception desk    [] [phone] is available Monday through Friday 6 am to 6 pm    [x] Bring photo ID and insurance card    [] Bring in a copy of Living will or Durable Power of  papers. [x] Please be sure to arrange for responsible adult to provide transportation to and from the hospital    [x] Please arrange for responsible adult to be with you for the 24 hour period post procedure due to having anesthesia      GENERAL INSTRUCTIONS:    [x] Nothing by mouth after midnight, including gum, candy, mints or water    [x] You may brush your teeth, but do not swallow any water    [] Take medications as instructed with 1-2 oz of water    [x] Stop herbal supplements and vitamins 5 days prior to procedure    [] Follow preop dosing of blood thinners per physician instructions    [] Take 1/2 dose of evening insulin, but no insulin after midnight    [] No oral diabetic medications after midnight    [] If diabetic and have low blood sugar or feel symptomatic, take 1-2oz apple juice only    [] Bring inhalers day of surgery    [] Bring C-PAP/ Bi-Pap day of surgery    [] Bring urine specimen day of surgery    [x] Shower or bath with soap, lather and rinse well, AM of Surgery, no lotion, powders or creams to surgical site    [x] Follow bowel prep as instructed per surgeon    [x] No tobacco products within 24 hours of surgery     [x] No alcohol or illegal drug use within 24 hours of surgery.     [x] Jewelry, body piercing's, eyeglasses, contact lenses and dentures are not permitted into surgery (bring cases)      [x] Please do not wear any nail polish, make up or hair

## 2020-09-16 ENCOUNTER — ANESTHESIA (OUTPATIENT)
Dept: ENDOSCOPY | Age: 50
End: 2020-09-16
Payer: COMMERCIAL

## 2020-09-16 ENCOUNTER — HOSPITAL ENCOUNTER (OUTPATIENT)
Age: 50
Setting detail: OUTPATIENT SURGERY
Discharge: HOME | End: 2020-09-16
Attending: SURGERY | Admitting: SURGERY
Payer: COMMERCIAL

## 2020-09-16 ENCOUNTER — ANESTHESIA EVENT (OUTPATIENT)
Dept: ENDOSCOPY | Age: 50
End: 2020-09-16
Payer: COMMERCIAL

## 2020-09-16 VITALS
SYSTOLIC BLOOD PRESSURE: 135 MMHG | TEMPERATURE: 96.9 F | OXYGEN SATURATION: 97 % | WEIGHT: 125 LBS | HEART RATE: 59 BPM | RESPIRATION RATE: 20 BRPM | DIASTOLIC BLOOD PRESSURE: 70 MMHG | BODY MASS INDEX: 18.94 KG/M2 | HEIGHT: 68 IN

## 2020-09-16 VITALS
DIASTOLIC BLOOD PRESSURE: 56 MMHG | RESPIRATION RATE: 12 BRPM | SYSTOLIC BLOOD PRESSURE: 109 MMHG | OXYGEN SATURATION: 100 %

## 2020-09-16 LAB
HCG(URINE) PREGNANCY TEST: NEGATIVE
SARS-COV-2: NOT DETECTED
SOURCE: NORMAL

## 2020-09-16 PROCEDURE — 6360000002 HC RX W HCPCS: Performed by: NURSE ANESTHETIST, CERTIFIED REGISTERED

## 2020-09-16 PROCEDURE — 3700000001 HC ADD 15 MINUTES (ANESTHESIA): Performed by: SURGERY

## 2020-09-16 PROCEDURE — 7100000011 HC PHASE II RECOVERY - ADDTL 15 MIN: Performed by: SURGERY

## 2020-09-16 PROCEDURE — 2580000003 HC RX 258: Performed by: SURGERY

## 2020-09-16 PROCEDURE — 81025 URINE PREGNANCY TEST: CPT

## 2020-09-16 PROCEDURE — 45378 DIAGNOSTIC COLONOSCOPY: CPT | Performed by: SURGERY

## 2020-09-16 PROCEDURE — 3700000000 HC ANESTHESIA ATTENDED CARE: Performed by: SURGERY

## 2020-09-16 PROCEDURE — 7100000010 HC PHASE II RECOVERY - FIRST 15 MIN: Performed by: SURGERY

## 2020-09-16 PROCEDURE — 2709999900 HC NON-CHARGEABLE SUPPLY: Performed by: SURGERY

## 2020-09-16 PROCEDURE — 3609027000 HC COLONOSCOPY: Performed by: SURGERY

## 2020-09-16 RX ORDER — PROPOFOL 10 MG/ML
INJECTION, EMULSION INTRAVENOUS CONTINUOUS PRN
Status: DISCONTINUED | OUTPATIENT
Start: 2020-09-16 | End: 2020-09-16 | Stop reason: SDUPTHER

## 2020-09-16 RX ORDER — FENTANYL CITRATE 50 UG/ML
INJECTION, SOLUTION INTRAMUSCULAR; INTRAVENOUS PRN
Status: DISCONTINUED | OUTPATIENT
Start: 2020-09-16 | End: 2020-09-16 | Stop reason: SDUPTHER

## 2020-09-16 RX ORDER — SODIUM CHLORIDE 9 MG/ML
INJECTION, SOLUTION INTRAVENOUS CONTINUOUS
Status: DISCONTINUED | OUTPATIENT
Start: 2020-09-16 | End: 2020-09-16 | Stop reason: HOSPADM

## 2020-09-16 RX ADMIN — PROPOFOL 250 MCG/KG/MIN: 10 INJECTION, EMULSION INTRAVENOUS at 09:56

## 2020-09-16 RX ADMIN — SODIUM CHLORIDE: 9 INJECTION, SOLUTION INTRAVENOUS at 09:05

## 2020-09-16 RX ADMIN — FENTANYL CITRATE 50 MCG: 50 INJECTION, SOLUTION INTRAMUSCULAR; INTRAVENOUS at 09:58

## 2020-09-16 RX ADMIN — FENTANYL CITRATE 50 MCG: 50 INJECTION, SOLUTION INTRAMUSCULAR; INTRAVENOUS at 09:54

## 2020-09-16 ASSESSMENT — PAIN SCALES - GENERAL
PAINLEVEL_OUTOF10: 0

## 2020-09-16 ASSESSMENT — LIFESTYLE VARIABLES: SMOKING_STATUS: 1

## 2020-09-16 ASSESSMENT — PAIN - FUNCTIONAL ASSESSMENT: PAIN_FUNCTIONAL_ASSESSMENT: 0-10

## 2020-09-16 NOTE — H&P
Patient's office history and physical was reviewed. Patient examined. There has been no change in the patient's history and physical.      Physician Signature: Electronically signed by Dr. Vipul Haynes Surgery History and Physical     Patient's Name/Date of Birth: Fariha Hope / 1970       PCP: Xiomy Arevalo DO     Referring Physician:   Ana Ramires*  [phone]     CHIEF COMPLAINT:         Chief Complaint   Patient presents with    Mass       growth in rectum            HISTORY OF PRESENT ILLNESS:     Fariha Hope is an 48 y.o. female who presents with a mass hear her rectum. She said it has been there at least a year. She said it bleeds and gets itchy. She thinks it is getting larger. She said it is getting in the way when she wipes and blood gets on the toilet paper. It is bright red. No nausea, vomiting, diarrhea, constipation. No changes in stool caliber. No bloody or black stools. No abdominal pain. No unintentional weight loss. No family history of colon cancer. The patient has a known history of: no known risk factors. The patient has never had a colonoscopy before.      Past Medical History:   Past Medical History   History reviewed.  No pertinent past medical history.         Past Surgical History:   Past Surgical History         Past Surgical History:   Procedure Laterality Date    DILATION AND CURETTAGE OF UTERUS   02/14/2013     FOR MISSED AB  9 WEEKS    [facility]   [facility]16[facility]2018     [facility]    MAXILLARY SINUSOTOMY   10/16/2018     [facility]    [facility]OPLASTY   10/16/2018     [facility]    TONSILLECTOMY                Allergies: Levaquin [levofloxacin in d5w]      Medications:   Current Facility-Administered Medications          Current Outpatient Medications   Medication Sig Dispense Refill    FOLIC ACID PO Take by mouth daily        ZINC PO Take by mouth daily        Ascorbic Acid (VITAMIN C PO) Take by mouth daily        VITAMIN D PO Take by mouth daily        montelukast (SINGULAIR) 10 MG tablet Take 1 tablet by mouth nightly 30 tablet 5    fluticasone (FLONASE) 50 MCG/ACT nasal spray INSTILL 2 SPRAYS INTO EACH NOSTRIL EVERY DAY 1 Bottle 2    Omega-3 Fatty Acids (FISH OIL) 1000 MG CAPS Take 1 capsule by mouth        Multiple Vitamins-Minerals (MULTIVITAMIN ADULT PO) Take 1 capsule by mouth        Fexofenadine HCl (ALLEGRA PO) Take by mouth        triamcinolone (KENALOG) 0.1 % cream Apply topically 2 times daily. (Patient not taking: Reported on 8/3/2020) 15 g 0    hydrOXYzine (ATARAX) 25 MG tablet Take 1-2 PO every 6-8 hours prn itching (Patient not taking: Reported on 8/3/2020) 30 tablet 0    ALPRAZolam (XANAX) 0.25 MG tablet Take 1 tablet by mouth daily as needed for Anxiety (Patient not taking: Reported on 8/3/2020) 20 tablet 0      No current facility-administered medications for this visit.              Social History:   Social History            Tobacco Use    Smoking status: Former Smoker    Smokeless tobacco: Never Used    Tobacco comment: quit smoking jan. 2013   Substance Use Topics    Alcohol use: Yes       Comment: social         Family History:   Family History   History reviewed. No pertinent family history.        REVIEW OF SYSTEMS:    Constitutional: negative  Eyes: negative  Ears, nose, mouth, throat, and face: negative  Respiratory: negative  Cardiovascular: negative  Gastrointestinal: as in HPI  Genitourinary:negative  Integument/breast: negative  Hematologic/lymphatic: negative  Musculoskeletal:negative  Neurological: negative  Allergic/Immunologic: negative     PHYSICAL EXAM   BP (!) 145/89   Pulse 87   Temp 97.6 °F (36.4 °C) (Infrared)   Resp 18   Ht 5' 8\" (1.727 m)   Wt 127 lb (57.6 kg)   SpO2 100%   BMI 19.31 kg/m²      General appearance: alert, cooperative and in no acute distress.   Eyes: Grossly normal   Lungs: clear to auscultation bilaterally  Heart: regular rate and rhythm  Abdomen:  soft, non-tender; bowel sounds normal; no masses,  no organomegaly  Skin: No skin abnormalities  Neurologic: Alert and oriented x 3. Grossly normal  Musculoskeletal: No clubbing cyanosis or edema. Rectal:      ASSESSMENT AND PLAN:       Assessment: Poornima Brannon is an 48 y.o. female who presents for a colonoscopy with external hemorrhoid    Plan: I will set the patient up for a colonoscopy, possible biopsy, possible polypectomy. I explained the risks including but not limited to bleeding, perforation leading to possible surgery, or infection. The benefits, alternatives, and potential complications associated with the above procedure to be performed and transfusions when applicable with the patient/responsible person prior to the procedure. I discussed the risk of bowel peroration, postoperative bleeding, post-polypectomy syndrome, as well as the possibility of needing emergency surgery or another colonoscopy. All of the patient's questions were answered. The patient understands and agrees to the procedure.      Excision of external hemorrhoid - can possibly do this at the time of the colonoscopy.  Will assess at that time.     Physician Signature: Electronically signed by Xander Fan MD, General Surgery     Send copy of H&P to PCP, Nicole Grullon DO and referring physician, Marie Maya*

## 2020-09-16 NOTE — ANESTHESIA POSTPROCEDURE EVALUATION
Department of Anesthesiology  Postprocedure Note    Patient: Cyndra Seip  MRN: [id number]  YOB: 1970  Date of evaluation: 9/16/2020  Time:  11:15 AM     Procedure Summary     Date:  09/16/20 Room / Location:  [address] [place] [address]    Anesthesia Start:  5939 Anesthesia Stop:  2182    Procedure:  COLONOSCOPY DIAGNOSTIC (N/A ) Diagnosis:  (BLEEDING HEMORRHOIDS)    Surgeon:  Elina Thomas MD Responsible Provider:  Jaime More MD    Anesthesia Type:  MAC ASA Status:  2          Anesthesia Type: MAC    Celi Phase I: Celi Score: 10    Celi Phase II: Celi Score: 10    Last vitals: Reviewed and per EMR flowsheets.        Anesthesia Post Evaluation    Patient location during evaluation: PACU  Patient participation: complete - patient participated  Level of consciousness: awake and alert  Airway patency: patent  Nausea & Vomiting: no vomiting and no nausea  Complications: no  Cardiovascular status: blood pressure returned to baseline  Respiratory status: acceptable  Hydration status: euvolemic

## 2020-09-16 NOTE — OP NOTE
Operative Note: Colonoscopy    Anneliese Saeed     DATE OF PROCEDURE: 9/16/2020  SURGEON: Dr. Marek Engle M.D. PREOPERATIVE DIAGNOSES:    Screening    POSTOPERATIVE DIAGNOSES:  Small external hemorrhoid  Small internal hemorrhoids    SPECIMENS:  * No specimens in log *     OPERATION:   Colonoscopy to the cecum       ANESTHESIA: LMAC    COMPLICATIONS: None. BLOOD LOSS: Minimal    Procedure Note:    CONSENT AND INDICATIONS:  This is a 48y.o. year old female who is having a screening colonoscopy today. I have discussed with the patient and/or the patient representative the indication, alternatives, and the possible risks and/or complications of the planned procedure and the anesthesia methods. The patient and/or patient representative appear to understand and agree to proceed. OPERATIONS: Bowel prep was done yesterday until the bowels were clear. The patient was placed on the table and sedated by anesthesia. A rectal exam was performed and no mass was felt. A lubricated scope was passed into the rectum which looked normal.  The scope was passed all the way around through the sigmoid, descending, transverse and ascending colon to the cecum. The bowel prep was clear. There was a small external hemorrhoid on a stalk. There were also small internal hemorrhoids. The cecum was identified by the appendiceal orifice, ileocecal valve, and light reflex in the RLQ. The scope was then slowly withdrawn, each area was examined again on the way out. No other abnormalities were seen. The scope was retroflexed in the rectum and the internal hemorrhoids were again visualized . The patient tolerated the procedure well. PLAN:     Follow up colonoscopy in 10 years. Physician Signature: Electronically signed by Dr. Marek Engle M.D.  FACS    Send copy of H&P to PCP, Hema Chen DO and referring physician, Carmita Kahn*

## 2020-09-16 NOTE — ANESTHESIA PRE PROCEDURE
Department of Anesthesiology  Preprocedure Note       Name:  Poornima Brannon   Age:  48 y.o.  :  1970                                          MRN:  [id number]         Date:  2020      Surgeon: Deonna De La Cruz):  Xander Fan MD    Procedure: Procedure(s):  COLONOSCOPY DIAGNOSTIC    Medications prior to admission:   Prior to Admission medications    Medication Sig Start Date End Date Taking? Authorizing Provider   fluticasone (FLONASE) 50 MCG/ACT nasal spray USE 2 SPRAYS IN EACH NOSTRIL EVERY DAY 8/3/20  Yes Jagdeep Solomon DO   FOLIC ACID PO Take by mouth daily   Yes Historical Provider, MD   ZINC PO Take by mouth daily   Yes Historical Provider, MD   Ascorbic Acid (VITAMIN C PO) Take by mouth daily   Yes Historical Provider, MD   VITAMIN D PO Take by mouth daily   Yes Historical Provider, MD   Omega-3 Fatty Acids (FISH OIL) 1000 MG CAPS Take 1 capsule by mouth   Yes Historical Provider, MD   Multiple Vitamins-Minerals (MULTIVITAMIN ADULT PO) Take 1 capsule by mouth   Yes Historical Provider, MD   Fexofenadine HCl (ALLEGRA PO) Take by mouth   Yes Historical Provider, MD       Current medications:    Current Facility-Administered Medications   Medication Dose Route Frequency Provider Last Rate Last Dose    0.9 % sodium chloride infusion   Intravenous Continuous Xander Fan MD           Allergies:     Allergies   Allergen Reactions    Levaquin [Levofloxacin In D5w] Other (See Comments)     Nightmares       Problem List:    Patient Active Problem List   Diagnosis Code    Body aches R52    Acute gastroenteritis K52.9    Cold sore B00.1    Acute bacterial sinusitis J01.90, B96.89       Past Medical History:        Diagnosis Date    Colon cancer screening        Past Surgical History:        Procedure Laterality Date    DILATION AND CURETTAGE OF UTERUS  2013    FOR MISSED AB  9 WEEKS    [facility]    MAXILLARY S[facility]USOTOMY  10/16/2018    [facility] [employer] [facility]  [facility]    TONSILLECTOMY         Social History:    Social History     Tobacco Use    Smoking status: Former Smoker    Smokeless tobacco: Never Used    Tobacco comment: quit smoking jan. 2013   Substance Use Topics    Alcohol use: Yes     Comment: 5 drinks per week                                Counseling given: Not Answered  Comment: quit smoking jan. 2013      Vital Signs (Current):   Vitals:    09/11/20 1257 09/16/20 0847   BP:  131/77   Pulse:  72   Resp:  18   Temp:  96 °F (35.6 °C)   TempSrc:  Temporal   SpO2:  97%   Weight: 125 lb (56.7 kg) 125 lb (56.7 kg)   Height: 5' 8\" (1.727 m) 5' 8\" (1.727 m)                                              BP Readings from Last 3 Encounters:   09/16/20 131/77   08/03/20 (!) 145/89   03/13/20 116/64       NPO Status: Time of last liquid consumption: 2230                        Time of last solid consumption: 1000                        Date of last liquid consumption: 09/15/20                        Date of last solid food consumption: 09/15/20    BMI:   Wt Readings from Last 3 Encounters:   09/16/20 125 lb (56.7 kg)   08/03/20 127 lb (57.6 kg)   03/13/20 130 lb (59 kg)     Body mass index is 19.01 kg/m². CBC: No results found for: WBC, RBC, HGB, HCT, MCV, RDW, PLT    CMP: No results found for: NA, K, CL, CO2, BUN, CREATININE, GFRAA, AGRATIO, LABGLOM, GLUCOSE, PROT, CALCIUM, BILITOT, ALKPHOS, AST, ALT    POC Tests: No results for input(s): POCGLU, POCNA, POCK, POCCL, POCBUN, POCHEMO, POCHCT in the last 72 hours.     Coags: No results found for: PROTIME, INR, APTT    HCG (If Applicable):   Lab Results   Component Value Date    PREGTESTUR NEGATIVE 09/16/2020        ABGs: No results found for: PHART, PO2ART, BPN3UVY, CZI0UQK, BEART, K1UXVNYN     Type & Screen (If Applicable):  No results found for: LABABO, LABRH    Drug/Infectious Status (If Applicable):  No results found for: HIV, HEPCAB    COVID-19 Screening (If Applicable):   Lab Results Component Value Date    COVID19 Not Detected 09/11/2020         Anesthesia Evaluation  Patient summary reviewed and Nursing notes reviewed no history of anesthetic complications:   Airway: Mallampati: II  TM distance: >3 FB   Neck ROM: full  Mouth opening: > = 3 FB Dental:    (+) caps      Pulmonary:Negative Pulmonary ROS breath sounds clear to auscultation  (+) current smoker                           Cardiovascular:  Exercise tolerance: good (>4 METS),   (+) valvular problems/murmurs: MVP,         Rhythm: regular  Rate: normal           Beta Blocker:  Not on Beta Blocker         Neuro/Psych:   Negative Neuro/Psych ROS              GI/Hepatic/Renal:   (+) bowel prep,           Endo/Other: Negative Endo/Other ROS                    Abdominal:           Vascular: negative vascular ROS. Anesthesia Plan      MAC     ASA 2       Induction: intravenous. Anesthetic plan and risks discussed with patient and spouse.                       Farooq Fletcher MD   9/16/2020

## 2020-09-28 ENCOUNTER — OFFICE VISIT (OUTPATIENT)
Dept: SURGERY | Age: 50
End: 2020-09-28
Payer: COMMERCIAL

## 2020-09-28 ENCOUNTER — HOSPITAL ENCOUNTER (OUTPATIENT)
Age: 50
Discharge: HOME | End: 2020-09-30
Payer: COMMERCIAL

## 2020-09-28 VITALS
WEIGHT: 122.8 LBS | HEIGHT: 68 IN | RESPIRATION RATE: 14 BRPM | BODY MASS INDEX: 18.61 KG/M2 | DIASTOLIC BLOOD PRESSURE: 82 MMHG | TEMPERATURE: 97 F | HEART RATE: 85 BPM | SYSTOLIC BLOOD PRESSURE: 126 MMHG | OXYGEN SATURATION: 97 %

## 2020-09-28 PROCEDURE — 88305 TISSUE EXAM BY PATHOLOGIST: CPT

## 2020-09-28 PROCEDURE — 46922 EXCISION OF ANAL LESION(S): CPT | Performed by: SURGERY

## 2020-09-28 RX ORDER — MONTELUKAST SODIUM 10 MG/1
TABLET ORAL
COMMUNITY
Start: 2020-09-23 | End: 2020-12-29 | Stop reason: SDUPTHER

## 2020-09-28 NOTE — PROGRESS NOTES
Operative Note    Anneliese Saeed     DATE OF PROCEDURE: 9/28/2020  SURGEON: Herlinda Nava MD    PREOPERATIVE DIAGNOSIS: External hemorrhoid    POSTOPERATIVE DIAGNOSIS: Same    OPERATION: Excision of external hemorrhoid     ANESTHESIA: 1% lidocaine with epinephrine      ESTIMATED BLOOD LOSS: Minimal    SPECIMEN: external hemorrhoid    COMPLICATIONS: None    PROCEDURE IN DETAIL: The patient lied on the procedure table in the prone position. The perianal area was prepped with Chloraprep and draped in a sterile fashion. The area around the hemorrhoid was anesthetized with 1% lidocaine with epinephrine, 6 cc. The skin around the hemorrhoid was opened with a 15-blade scalpel and the hemorrhoid was completely excised from surrounding tissue. It was placed in formalin for evaluation by pathology. The incision was then closed with a locking 4-0 Vicryl running stitch. There was good hemostasis. A sterile dressing was placed. The patient tolerated the procedure well. LAVONNE SHARMA MD, FACS 9/28/2020 5:23 PM     Send copy of H&P to PCP, Joni Leigh DO and referring physician, No ref.  provider found

## 2020-09-28 NOTE — PROGRESS NOTES
MA assisted dr Malik rosales with in office procedure of excision perianal skin tag. 5ml lidocaine with epi administered by dr Mayra Ayers.    Lot # [id number]  Exp 05/21   Pt tolerated procedure well given post op instructions    Electronically signed by Gulshan Escobar MA on 9/28/20 at 4:32 PM EDT

## 2020-10-12 ENCOUNTER — OFFICE VISIT (OUTPATIENT)
Dept: SURGERY | Age: 50
End: 2020-10-12

## 2020-10-12 VITALS
RESPIRATION RATE: 16 BRPM | OXYGEN SATURATION: 98 % | DIASTOLIC BLOOD PRESSURE: 78 MMHG | WEIGHT: 127 LBS | HEIGHT: 68 IN | HEART RATE: 64 BPM | SYSTOLIC BLOOD PRESSURE: 118 MMHG | TEMPERATURE: 97.6 F | BODY MASS INDEX: 19.25 KG/M2

## 2020-10-12 PROCEDURE — 99024 POSTOP FOLLOW-UP VISIT: CPT | Performed by: SURGERY

## 2020-10-12 NOTE — PROGRESS NOTES
Progress Note - Post-op follow up     Patient's Name/Date of Birth: Valentin Moyer / 1970    Date: 10/12/2020    PCP: Georgie De La Rosa DO    Referring Physician:   Piper Rahmanr*  [phone]    Chief Complaint   Patient presents with    Other     Pt states she has no concerns or complaints. Subjective:  Patient has no complaints. Patient's medications, allergies, past medical, surgical, social and family histories were reviewed and updated as appropriate. Physical Exam:  /78 (Site: Right Upper Arm, Position: Sitting, Cuff Size: Medium Adult)   Pulse 64   Temp 97.6 °F (36.4 °C) (Temporal)   Resp 16   Ht 5' 8\" (1.727 m)   Wt 127 lb (57.6 kg)   SpO2 98%   BMI 19.31 kg/m²   General Appearance: NAD  Perianal  incision C/D/I    Data Reviewed: Pathology reviewed with patient  Diagnosis:   \"Skin tag\", perianal, excision: Polypoid squamous mucosa showing changes   consistent with condyloma    acuminatum (viral wart).      Assessment/Plan:    48y.o. year old female s/p excision perianal condyloma    Patient recovering well from surgery  Wound care discussed  Follow up MATIAS Camara MD 10/12/2020 5:42 PM     Send copy of H&P to PCP, Georgie De La Rosa DO and referring physician, Piper Rahmanr*

## 2020-11-12 LAB — MAMMOGRAPHY, EXTERNAL: NORMAL

## 2020-12-01 RX ORDER — FLUTICASONE PROPIONATE 50 MCG
SPRAY, SUSPENSION (ML) NASAL
Qty: 1 BOTTLE | Refills: 2 | Status: SHIPPED
Start: 2020-12-01 | End: 2021-03-23

## 2020-12-29 RX ORDER — MONTELUKAST SODIUM 10 MG/1
10 TABLET ORAL NIGHTLY
Qty: 30 TABLET | Refills: 3 | Status: SHIPPED
Start: 2020-12-29 | End: 2021-03-24

## 2021-03-23 DIAGNOSIS — J32.4 CHRONIC PANSINUSITIS: ICD-10-CM

## 2021-03-23 RX ORDER — FLUTICASONE PROPIONATE 50 MCG
SPRAY, SUSPENSION (ML) NASAL
Qty: 1 BOTTLE | Refills: 2 | Status: SHIPPED
Start: 2021-03-23 | End: 2021-06-21

## 2021-06-18 ENCOUNTER — VIRTUAL VISIT (OUTPATIENT)
Dept: PRIMARY CARE CLINIC | Age: 51
End: 2021-06-18
Payer: COMMERCIAL

## 2021-06-18 DIAGNOSIS — J01.10 ACUTE NON-RECURRENT FRONTAL SINUSITIS: Primary | ICD-10-CM

## 2021-06-18 PROCEDURE — 99213 OFFICE O/P EST LOW 20 MIN: CPT | Performed by: FAMILY MEDICINE

## 2021-06-18 PROCEDURE — 1036F TOBACCO NON-USER: CPT | Performed by: FAMILY MEDICINE

## 2021-06-18 PROCEDURE — G8420 CALC BMI NORM PARAMETERS: HCPCS | Performed by: FAMILY MEDICINE

## 2021-06-18 PROCEDURE — G8427 DOCREV CUR MEDS BY ELIG CLIN: HCPCS | Performed by: FAMILY MEDICINE

## 2021-06-18 PROCEDURE — 3017F COLORECTAL CA SCREEN DOC REV: CPT | Performed by: FAMILY MEDICINE

## 2021-06-18 RX ORDER — AMOXICILLIN AND CLAVULANATE POTASSIUM 875; 125 MG/1; MG/1
1 TABLET, FILM COATED ORAL 2 TIMES DAILY
Qty: 14 TABLET | Refills: 0 | Status: SHIPPED | OUTPATIENT
Start: 2021-06-18 | End: 2021-06-25

## 2021-06-18 RX ORDER — PREDNISONE 10 MG/1
TABLET ORAL
COMMUNITY
Start: 2021-06-17 | End: 2021-08-26 | Stop reason: ALTCHOICE

## 2021-06-18 ASSESSMENT — ENCOUNTER SYMPTOMS
SINUS PRESSURE: 1
SORE THROAT: 1
RHINORRHEA: 0
SHORTNESS OF BREATH: 0
SINUS PAIN: 0
COUGH: 0

## 2021-06-18 ASSESSMENT — PATIENT HEALTH QUESTIONNAIRE - PHQ9
SUM OF ALL RESPONSES TO PHQ QUESTIONS 1-9: 0
SUM OF ALL RESPONSES TO PHQ9 QUESTIONS 1 & 2: 0
1. LITTLE INTEREST OR PLEASURE IN DOING THINGS: 0
2. FEELING DOWN, DEPRESSED OR HOPELESS: 0

## 2021-06-18 NOTE — PROGRESS NOTES
postnasal drip, sinus pressure (around eyes and forehead. ) and sore throat. Negative for congestion, ear pain (but feels clogged), rhinorrhea and sinus pain. Respiratory: Negative for cough and shortness of breath. Neurological: Positive for headaches. other review of systems reviewed and are negative    OBJECTIVE:  There were no vitals taken for this visit. Physical Exam  Constitutional:       General: She is not in acute distress. Appearance: Normal appearance. She is not ill-appearing, toxic-appearing or diaphoretic. HENT:      Head: Normocephalic. Eyes:      General: No scleral icterus. Pulmonary:      Effort: Pulmonary effort is normal.   Neurological:      Mental Status: She is alert and oriented to person, place, and time. Psychiatric:         Mood and Affect: Mood normal.         ASSESSMENT AND PLAN:    Nae Jane was seen today for sinusitis. Diagnoses and all orders for this visit:    Acute non-recurrent frontal sinusitis  -     amoxicillin-clavulanate (AUGMENTIN) 875-125 MG per tablet; Take 1 tablet by mouth 2 times daily for 7 days    - continue saline nasal flushes but stop Flonase for 2 wks. Return if symptoms worsen or fail to improve. Aviva Covarrubias DO    TeleMedicine Patient Consent    This visit was performed as a virtual video visit using a synchronous, two-way, audio-video telehealth technology platform. Patient identification was verified at the start of the visit, including the patient's telephone number and physical location. I discussed with the patient the nature of our telehealth visits, that:     1. Due to the nature of an audio- video modality, the only components of a physical exam that could be done are the elements supported by direct observation. 2. I would evaluate the patient and recommend diagnostics and treatments based on my assessment.   3. If it was felt that the patient should be evaluated in clinic or an emergency room setting, then they would be directed there. 4. Our sessions are not being recorded and that personal health information is protected. 5. Our team would provide follow up care in person if/when the patient needs it. Patient does agree to proceed with telemedicine consultation. Patient's location: home address in [facility]. Physician  location [facility]. other people involved in call none. Time spent: Not billed by time    This visit was completed virtually using Doxy. me

## 2021-06-19 DIAGNOSIS — J32.4 CHRONIC PANSINUSITIS: ICD-10-CM

## 2021-06-21 RX ORDER — FLUTICASONE PROPIONATE 50 MCG
SPRAY, SUSPENSION (ML) NASAL
Qty: 1 BOTTLE | Refills: 2 | Status: SHIPPED
Start: 2021-06-21 | End: 2021-08-19 | Stop reason: SDUPTHER

## 2021-07-24 RX ORDER — MONTELUKAST SODIUM 10 MG/1
TABLET ORAL
Qty: 90 TABLET | Refills: 1 | Status: SHIPPED
Start: 2021-07-24 | End: 2021-08-19 | Stop reason: SDUPTHER

## 2021-08-19 DIAGNOSIS — J32.4 CHRONIC PANSINUSITIS: ICD-10-CM

## 2021-08-19 RX ORDER — FLUTICASONE PROPIONATE 50 MCG
SPRAY, SUSPENSION (ML) NASAL
Qty: 3 BOTTLE | Refills: 0 | Status: SHIPPED
Start: 2021-08-19 | End: 2022-08-02 | Stop reason: SDUPTHER

## 2021-08-19 RX ORDER — MONTELUKAST SODIUM 10 MG/1
TABLET ORAL
Qty: 90 TABLET | Refills: 0 | Status: SHIPPED
Start: 2021-08-19 | End: 2022-01-20 | Stop reason: SDUPTHER

## 2021-08-19 NOTE — TELEPHONE ENCOUNTER
Patient stopped in needs medical clearance for D & C wants to see if you will do? and some refills needs 90 day mail away.

## 2021-08-26 ENCOUNTER — OFFICE VISIT (OUTPATIENT)
Dept: PRIMARY CARE CLINIC | Age: 51
End: 2021-08-26
Payer: OTHER GOVERNMENT

## 2021-08-26 VITALS
HEART RATE: 63 BPM | OXYGEN SATURATION: 97 % | HEIGHT: 68 IN | SYSTOLIC BLOOD PRESSURE: 110 MMHG | BODY MASS INDEX: 17.73 KG/M2 | WEIGHT: 117 LBS | TEMPERATURE: 97.2 F | DIASTOLIC BLOOD PRESSURE: 70 MMHG

## 2021-08-26 DIAGNOSIS — Z01.818 PRE-OP EXAM: Primary | ICD-10-CM

## 2021-08-26 PROBLEM — K52.9 ACUTE GASTROENTERITIS: Status: RESOLVED | Noted: 2019-12-16 | Resolved: 2021-08-26

## 2021-08-26 PROBLEM — R52 BODY ACHES: Status: RESOLVED | Noted: 2019-12-16 | Resolved: 2021-08-26

## 2021-08-26 PROCEDURE — 99213 OFFICE O/P EST LOW 20 MIN: CPT | Performed by: FAMILY MEDICINE

## 2021-08-26 NOTE — PROGRESS NOTES
Karlo Garcia, a female of 46 y.o. came to the office 8/26/2021. Patient Active Problem List   Diagnosis    Cold sore    Acute bacterial sinusitis          HPI pre op exam: needs D and C for abnormal bleeding and thick uterus. Gen: thinks she is going to get a divorce. She doesn't have any children with him but has 3. They don't talk anymore despite living there. Was depressed and anxious but not now. Allergies   Allergen Reactions    Flagyl [Metronidazole] Hives     Itchy lips swollen    Levaquin [Levofloxacin In D5w] Other (See Comments)     Nightmares       Current Outpatient Medications on File Prior to Visit   Medication Sig Dispense Refill    fluticasone (FLONASE) 50 MCG/ACT nasal spray Two sprays each nostril daily. 3 Bottle 0    montelukast (SINGULAIR) 10 MG tablet TAKE 1 TABLET BY MOUTH EVERY DAY AT NIGHT 90 tablet 0    FOLIC ACID PO Take by mouth daily      ZINC PO Take by mouth daily      Ascorbic Acid (VITAMIN C PO) Take by mouth daily      VITAMIN D PO Take by mouth daily      Omega-3 Fatty Acids (FISH OIL) 1000 MG CAPS Take 1 capsule by mouth      Multiple Vitamins-Minerals (MULTIVITAMIN ADULT PO) Take 1 capsule by mouth      Fexofenadine HCl (ALLEGRA PO) Take by mouth       Current Facility-Administered Medications on File Prior to Visit   Medication Dose Route Frequency Provider Last Rate Last Admin    lidocaine-EPINEPHrine 1 percent-[id number] injection 5 mL  5 mL Intradermal Once Leeann Nuñez MD           Review of Systems   Constitutional: Negative for fatigue. Psychiatric/Behavioral: Negative for dysphoric mood. The patient is not nervous/anxious. Stress with job as well.     other review of systems reviewed and are negative    OBJECTIVE:  /70   Pulse 63   Temp 97.2 °F (36.2 °C)   Ht 5' 8\" (1.727 m)   Wt 117 lb (53.1 kg)   SpO2 97%   BMI 17.79 kg/m²      Physical Exam  Vitals reviewed. Eyes:      General: No scleral icterus. Conjunctiva/sclera: Conjunctivae normal.   Neck:      Thyroid: No thyromegaly. Vascular: No carotid bruit. Cardiovascular:      Rate and Rhythm: Normal rate and regular rhythm. Heart sounds: No murmur heard. Pulmonary:      Effort: Pulmonary effort is normal.      Breath sounds: Normal breath sounds. No wheezing or rales. Abdominal:      General: Bowel sounds are normal.      Palpations: Abdomen is soft. There is no mass. Tenderness: There is no abdominal tenderness. There is no guarding or rebound. Musculoskeletal:         General: Normal range of motion. Cervical back: Neck supple. Lymphadenopathy:      Cervical: No cervical adenopathy. Skin:     General: Skin is warm and dry. Neurological:      Mental Status: She is alert and oriented to person, place, and time. Psychiatric:         Mood and Affect: Mood normal.     wt down 10 lbs. ASSESSMENT AND PLAN:    Buster Canada was seen today for pre-op exam.    Diagnoses and all orders for this visit:    Pre-op exam    - low risk for surgery   - recommend vaccination for Covid  - encourage gaining wt    Return if symptoms worsen or fail to improve, for or for acute problem.     Andres Solomon, DO

## 2021-09-09 ENCOUNTER — HOSPITAL ENCOUNTER (OUTPATIENT)
Age: 51
Discharge: HOME | End: 2021-09-11

## 2021-09-09 PROCEDURE — 88305 TISSUE EXAM BY PATHOLOGIST: CPT

## 2022-01-20 RX ORDER — MONTELUKAST SODIUM 10 MG/1
TABLET ORAL
Qty: 90 TABLET | Refills: 0 | Status: SHIPPED
Start: 2022-01-20 | End: 2022-05-05 | Stop reason: SDUPTHER

## 2022-01-20 NOTE — TELEPHONE ENCOUNTER
----- Message from Carlita Lay sent at 1/19/2022  2:06 PM EST -----  Subject: Refill Request    QUESTIONS  Name of Medication? montelukast (SINGULAIR) 10 MG tablet  Patient-reported dosage and instructions? take one everyday  How many days do you have left? 0  Preferred Pharmacy?  [facility]  Pharmacy phone number (if available)? [phone]  Additional Information for Provider? 90 day supply  ---------------------------------------------------------------------------  --------------  CALL BACK INFO  What is the best way for the office to contact you? OK to leave message on   voicemail  Preferred Call Back Phone Number?  [phone]

## 2022-05-05 RX ORDER — MONTELUKAST SODIUM 10 MG/1
TABLET ORAL
Qty: 90 TABLET | Refills: 1 | Status: SHIPPED
Start: 2022-05-05 | End: 2022-08-02 | Stop reason: SDUPTHER

## 2022-08-02 DIAGNOSIS — J32.4 CHRONIC PANSINUSITIS: ICD-10-CM

## 2022-08-02 RX ORDER — MONTELUKAST SODIUM 10 MG/1
TABLET ORAL
Qty: 90 TABLET | Refills: 0 | Status: SHIPPED | OUTPATIENT
Start: 2022-08-02

## 2022-08-02 RX ORDER — FLUTICASONE PROPIONATE 50 MCG
SPRAY, SUSPENSION (ML) NASAL
Qty: 3 EACH | Refills: 0 | Status: SHIPPED | OUTPATIENT
Start: 2022-08-02
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
## 2022-08-30 RX ORDER — TRIAMCINOLONE ACETONIDE 1 MG/G
CREAM TOPICAL
Qty: 15 G | Refills: 0 | Status: SHIPPED | OUTPATIENT
Start: 2022-08-30

## 2023-01-10 RX ORDER — MONTELUKAST SODIUM 10 MG/1
TABLET ORAL
Qty: 90 TABLET | Refills: 0 | Status: SHIPPED | OUTPATIENT
Start: 2023-01-10

## 2023-01-10 NOTE — TELEPHONE ENCOUNTER
----- Message from Bert Robbins LPN sent at 8/13/1266  1:00 PM EST -----  Subject: Refill Request    QUESTIONS  Name of Medication? montelukast (SINGULAIR) 10 MG tablet  Patient-reported dosage and instructions? 1 daily  How many days do you have left? 10  Preferred Pharmacy? [facility]  Pharmacy phone number (if available)? [phone]  ---------------------------------------------------------------------------  --------------  CALL BACK INFO  What is the best way for the office to contact you? OK to leave message on   voicemail  Preferred Call Back Phone Number? [phone]  ---------------------------------------------------------------------------  --------------  SCRIPT ANSWERS  Relationship to Patient?  Self

## 2023-05-08 RX ORDER — MONTELUKAST SODIUM 10 MG/1
TABLET ORAL
Qty: 90 TABLET | Refills: 0 | OUTPATIENT
Start: 2023-05-08

## 2023-05-15 ENCOUNTER — OFFICE VISIT (OUTPATIENT)
Dept: PRIMARY CARE CLINIC | Age: 53
End: 2023-05-15
Payer: COMMERCIAL

## 2023-05-15 VITALS
TEMPERATURE: 97.2 F | HEIGHT: 68 IN | DIASTOLIC BLOOD PRESSURE: 78 MMHG | BODY MASS INDEX: 18.79 KG/M2 | HEART RATE: 63 BPM | OXYGEN SATURATION: 98 % | SYSTOLIC BLOOD PRESSURE: 118 MMHG | WEIGHT: 124 LBS

## 2023-05-15 DIAGNOSIS — J32.4 CHRONIC PANSINUSITIS: Primary | ICD-10-CM

## 2023-05-15 PROCEDURE — 99213 OFFICE O/P EST LOW 20 MIN: CPT | Performed by: FAMILY MEDICINE

## 2023-05-15 RX ORDER — MONTELUKAST SODIUM 10 MG/1
TABLET ORAL
Qty: 90 TABLET | Refills: 3 | Status: SHIPPED | OUTPATIENT
Start: 2023-05-15

## 2023-05-15 SDOH — ECONOMIC STABILITY: TRANSPORTATION INSECURITY
IN THE PAST 12 MONTHS, HAS LACK OF TRANSPORTATION KEPT YOU FROM MEETINGS, WORK, OR FROM GETTING THINGS NEEDED FOR DAILY LIVING?: NO

## 2023-05-15 SDOH — ECONOMIC STABILITY: FOOD INSECURITY: WITHIN THE PAST 12 MONTHS, THE FOOD YOU BOUGHT JUST DIDN'T LAST AND YOU DIDN'T HAVE MONEY TO GET MORE.: SOMETIMES TRUE

## 2023-05-15 SDOH — ECONOMIC STABILITY: HOUSING INSECURITY
IN THE LAST 12 MONTHS, WAS THERE A TIME WHEN YOU DID NOT HAVE A STEADY PLACE TO SLEEP OR SLEPT IN A SHELTER (INCLUDING NOW)?: NO

## 2023-05-15 SDOH — ECONOMIC STABILITY: FOOD INSECURITY: WITHIN THE PAST 12 MONTHS, YOU WORRIED THAT YOUR FOOD WOULD RUN OUT BEFORE YOU GOT MONEY TO BUY MORE.: SOMETIMES TRUE

## 2023-05-15 SDOH — ECONOMIC STABILITY: INCOME INSECURITY: HOW HARD IS IT FOR YOU TO PAY FOR THE VERY BASICS LIKE FOOD, HOUSING, MEDICAL CARE, AND HEATING?: SOMEWHAT HARD

## 2023-05-15 ASSESSMENT — PATIENT HEALTH QUESTIONNAIRE - PHQ9
SUM OF ALL RESPONSES TO PHQ9 QUESTIONS 1 & 2: 0
SUM OF ALL RESPONSES TO PHQ QUESTIONS 1-9: 0
2. FEELING DOWN, DEPRESSED OR HOPELESS: 0
SUM OF ALL RESPONSES TO PHQ QUESTIONS 1-9: 0
SUM OF ALL RESPONSES TO PHQ QUESTIONS 1-9: 0
1. LITTLE INTEREST OR PLEASURE IN DOING THINGS: 0
SUM OF ALL RESPONSES TO PHQ QUESTIONS 1-9: 0

## 2023-05-15 ASSESSMENT — ENCOUNTER SYMPTOMS
RHINORRHEA: 1
EYE ITCHING: 0
SINUS PRESSURE: 1
COUGH: 0
SHORTNESS OF BREATH: 0
SINUS PAIN: 0
SORE THROAT: 0
WHEEZING: 0

## 2023-05-15 NOTE — PROGRESS NOTES
and are negative    OBJECTIVE:  /78   Pulse 63   Temp 97.2 °F (36.2 °C)   Ht 5' 8\" (1.727 m)   Wt 124 lb (56.2 kg)   SpO2 98%   BMI 18.85 kg/m²      Physical Exam  Constitutional:       General: She is not in acute distress. HENT:      Right Ear: Tympanic membrane normal.      Left Ear: Tympanic membrane normal.      Nose: No mucosal edema or rhinorrhea. Right Sinus: Maxillary sinus tenderness and frontal sinus tenderness present. Left Sinus: Maxillary sinus tenderness and frontal sinus tenderness present. Mouth/Throat:      Pharynx: Uvula midline. No oropharyngeal exudate or posterior oropharyngeal erythema. Cardiovascular:      Rate and Rhythm: Normal rate and regular rhythm. Pulmonary:      Effort: Pulmonary effort is normal.      Breath sounds: Normal breath sounds. Musculoskeletal:      Cervical back: Neck supple. Lymphadenopathy:      Cervical: No cervical adenopathy. ASSESSMENT AND PLAN:    Nathaniel Michaels was seen today for sinusitis and other. Diagnoses and all orders for this visit:    Chronic pansinusitis  -     montelukast (SINGULAIR) 10 MG tablet; TAKE 1 TABLET BY MOUTH EVERY DAY AT NIGHT    - continue current med      Return if symptoms worsen or fail to improve, for as needed, or for acute problem.     Ross Solomon, DO

## 2023-06-08 LAB — MAMMOGRAPHY, EXTERNAL: NORMAL

## 2023-09-26 ENCOUNTER — TELEPHONE (OUTPATIENT)
Dept: PRIMARY CARE CLINIC | Age: 53
End: 2023-09-26

## 2023-09-26 DIAGNOSIS — J32.4 CHRONIC PANSINUSITIS: ICD-10-CM

## 2023-09-26 RX ORDER — FLUTICASONE PROPIONATE 50 MCG
SPRAY, SUSPENSION (ML) NASAL
Qty: 3 EACH | Refills: 1 | Status: SHIPPED | OUTPATIENT
Start: 2023-09-26

## 2024-03-26 DIAGNOSIS — J32.4 CHRONIC PANSINUSITIS: ICD-10-CM

## 2024-03-26 RX ORDER — FLUTICASONE PROPIONATE 50 MCG
SPRAY, SUSPENSION (ML) NASAL
Qty: 48 G | Refills: 1 | Status: SHIPPED | OUTPATIENT
Start: 2024-03-26

## 2024-05-26 DIAGNOSIS — J32.4 CHRONIC PANSINUSITIS: ICD-10-CM

## 2024-05-28 RX ORDER — MONTELUKAST SODIUM 10 MG/1
TABLET ORAL
Qty: 90 TABLET | Refills: 1 | Status: SHIPPED | OUTPATIENT
Start: 2024-05-28

## 2024-06-17 LAB — MAMMOGRAPHY, EXTERNAL: NORMAL

## 2024-07-22 ENCOUNTER — TELEPHONE (OUTPATIENT)
Dept: PRIMARY CARE CLINIC | Age: 54
End: 2024-07-22

## 2024-07-22 DIAGNOSIS — J32.4 CHRONIC PANSINUSITIS: ICD-10-CM

## 2024-07-22 RX ORDER — FLUTICASONE PROPIONATE 50 MCG
SPRAY, SUSPENSION (ML) NASAL
Qty: 48 G | Refills: 1 | Status: SHIPPED | OUTPATIENT
Start: 2024-07-22

## 2024-07-22 NOTE — TELEPHONE ENCOUNTER
Pt needs a refill on her Fluticasone 50 MCG inhaler.    [facility] in [place]    I did schedule her an appt for a physical for 08/01/2024

## 2024-08-01 ENCOUNTER — OFFICE VISIT (OUTPATIENT)
Dept: PRIMARY CARE CLINIC | Age: 54
End: 2024-08-01

## 2024-08-01 VITALS
WEIGHT: 133 LBS | HEIGHT: 68 IN | BODY MASS INDEX: 20.16 KG/M2 | DIASTOLIC BLOOD PRESSURE: 80 MMHG | OXYGEN SATURATION: 99 % | RESPIRATION RATE: 16 BRPM | SYSTOLIC BLOOD PRESSURE: 100 MMHG | HEART RATE: 64 BPM | TEMPERATURE: 97.4 F

## 2024-08-01 DIAGNOSIS — Z00.00 ANNUAL PHYSICAL EXAM: Primary | ICD-10-CM

## 2024-08-01 DIAGNOSIS — R51.9 SINUS HEADACHE: ICD-10-CM

## 2024-08-01 DIAGNOSIS — J32.4 CHRONIC PANSINUSITIS: ICD-10-CM

## 2024-08-01 LAB
ALBUMIN: 4.6 G/DL (ref 3.5–5.2)
ALP BLD-CCNC: 140 U/L (ref 35–104)
ALT SERPL-CCNC: 13 U/L (ref 0–32)
ANION GAP SERPL CALCULATED.3IONS-SCNC: 15 MMOL/L (ref 7–16)
AST SERPL-CCNC: 21 U/L (ref 0–31)
BILIRUB SERPL-MCNC: 0.3 MG/DL (ref 0–1.2)
BUN BLDV-MCNC: 8 MG/DL (ref 6–20)
CALCIUM SERPL-MCNC: 9.6 MG/DL (ref 8.6–10.2)
CHLORIDE BLD-SCNC: 105 MMOL/L (ref 98–107)
CHOLESTEROL, TOTAL: 211 MG/DL
CO2: 20 MMOL/L (ref 22–29)
CREAT SERPL-MCNC: 0.7 MG/DL (ref 0.5–1)
GFR, ESTIMATED: >90 ML/MIN/1.73M2
GLUCOSE BLD-MCNC: 97 MG/DL (ref 74–99)
HCT VFR BLD CALC: 39.6 % (ref 34–48)
HDLC SERPL-MCNC: 63 MG/DL
HEMOGLOBIN: 12.6 G/DL (ref 11.5–15.5)
LDL CHOLESTEROL: 132 MG/DL
MCH RBC QN AUTO: 29.1 PG (ref 26–35)
MCHC RBC AUTO-ENTMCNC: 31.8 G/DL (ref 32–34.5)
MCV RBC AUTO: 91.5 FL (ref 80–99.9)
PDW BLD-RTO: 13.1 % (ref 11.5–15)
PLATELET # BLD: 263 K/UL (ref 130–450)
PMV BLD AUTO: 10.5 FL (ref 7–12)
POTASSIUM SERPL-SCNC: 4.4 MMOL/L (ref 3.5–5)
RBC # BLD: 4.33 M/UL (ref 3.5–5.5)
SODIUM BLD-SCNC: 140 MMOL/L (ref 132–146)
TOTAL PROTEIN: 7.1 G/DL (ref 6.4–8.3)
TRIGL SERPL-MCNC: 79 MG/DL
VLDLC SERPL CALC-MCNC: 16 MG/DL
WBC # BLD: 7.4 K/UL (ref 4.5–11.5)

## 2024-08-01 RX ORDER — MONTELUKAST SODIUM 10 MG/1
10 TABLET ORAL NIGHTLY
Qty: 90 TABLET | Refills: 1 | Status: SHIPPED | OUTPATIENT
Start: 2024-08-01

## 2024-08-01 SDOH — ECONOMIC STABILITY: FOOD INSECURITY: WITHIN THE PAST 12 MONTHS, YOU WORRIED THAT YOUR FOOD WOULD RUN OUT BEFORE YOU GOT MONEY TO BUY MORE.: NEVER TRUE

## 2024-08-01 SDOH — ECONOMIC STABILITY: INCOME INSECURITY: HOW HARD IS IT FOR YOU TO PAY FOR THE VERY BASICS LIKE FOOD, HOUSING, MEDICAL CARE, AND HEATING?: NOT HARD AT ALL

## 2024-08-01 SDOH — ECONOMIC STABILITY: FOOD INSECURITY: WITHIN THE PAST 12 MONTHS, THE FOOD YOU BOUGHT JUST DIDN'T LAST AND YOU DIDN'T HAVE MONEY TO GET MORE.: NEVER TRUE

## 2024-08-01 ASSESSMENT — PATIENT HEALTH QUESTIONNAIRE - PHQ9
SUM OF ALL RESPONSES TO PHQ QUESTIONS 1-9: 0
SUM OF ALL RESPONSES TO PHQ QUESTIONS 1-9: 0
2. FEELING DOWN, DEPRESSED OR HOPELESS: NOT AT ALL
SUM OF ALL RESPONSES TO PHQ QUESTIONS 1-9: 0
SUM OF ALL RESPONSES TO PHQ9 QUESTIONS 1 & 2: 0
1. LITTLE INTEREST OR PLEASURE IN DOING THINGS: NOT AT ALL
SUM OF ALL RESPONSES TO PHQ QUESTIONS 1-9: 0

## 2024-08-01 NOTE — PROGRESS NOTES
SUBJECTIVE:    HPI: Anneliese Saeed  Was seen here today for   Chief Complaint   Patient presents with    Annual Exam    .  She states they are dealing with sinus headaches for past 2 months on and off worse past couple of wks. Ibu helps a little.     Past Medical History:   Diagnosis Date    Anal condyloma 2020    Internal hemorrhoids 2020       Past Surgical History:   Procedure Laterality Date    COLONOSCOPY N/A 2020    COLONOSCOPY DIAGNOSTIC performed by Sabrina Muñoz MD at [facility] ENDOSCOPY    DILATION AND CURETTAGE OF UTERUS  2013    FOR MISSED AB  9 WEEKS    [facility]OMY  [facility]/16/2018    [facility]    MAXILLARY SINUSOTOMY  10/16/2018    [facility] [place]    SEPTOPLASTY  10/16/2018    [facility]    TONSILLECTOMY         History reviewed. No pertinent family history.    Prior to Admission medications    Medication Sig Start Date End Date Taking? Authorizing Provider   montelukast (SINGULAIR) 10 MG tablet Take 1 tablet by mouth nightly 24  Yes Jagdeep Solomon DO   fluticasone (FLONASE) 50 MCG/ACT nasal spray SPRAY 2 SPRAYS INTO EACH NOSTRIL EVERY DAY 24  Yes Jagdeep Solomon DO   FOLIC ACID PO Take by mouth daily   Yes Sydni Bach MD   ZINC PO Take by mouth daily   Yes Sydni Bach MD   Ascorbic Acid (VITAMIN C PO) Take by mouth daily   Yes Sydni Bach MD   VITAMIN D PO Take by mouth daily   Yes Sydni Bach MD   Omega-3 Fatty Acids (FISH OIL) 1000 MG CAPS Take 1 capsule by mouth   Yes Sydni Bach MD   Multiple Vitamins-Minerals (MULTIVITAMIN ADULT PO) Take 1 capsule by mouth   Yes Sydni Bach MD       Flagyl [metronidazole] and Levaquin [levofloxacin in d5w]    Social History     Tobacco Use    Smoking status: Former     Current packs/day: 0.00     Average packs/day: 1 pack/day for 6.0 years (6.0 ttl pk-yrs)     Types: Cigarettes     Start date:      Quit date:      Years since quittin.6     Passive

## 2024-08-05 PROBLEM — E78.49 OTHER HYPERLIPIDEMIA: Status: ACTIVE | Noted: 2024-08-05

## 2024-09-23 ENCOUNTER — OFFICE VISIT (OUTPATIENT)
Age: 54
End: 2024-09-23
Payer: COMMERCIAL

## 2024-09-23 ENCOUNTER — TELEPHONE (OUTPATIENT)
Dept: PRIMARY CARE CLINIC | Age: 54
End: 2024-09-23

## 2024-09-23 VITALS
HEIGHT: 68 IN | OXYGEN SATURATION: 96 % | HEART RATE: 66 BPM | DIASTOLIC BLOOD PRESSURE: 72 MMHG | WEIGHT: 130.6 LBS | BODY MASS INDEX: 19.79 KG/M2 | TEMPERATURE: 98.5 F | SYSTOLIC BLOOD PRESSURE: 112 MMHG

## 2024-09-23 DIAGNOSIS — J01.10 ACUTE NON-RECURRENT FRONTAL SINUSITIS: ICD-10-CM

## 2024-09-23 DIAGNOSIS — R05.1 ACUTE COUGH: Primary | ICD-10-CM

## 2024-09-23 LAB
INFLUENZA A ANTIGEN, POC: NEGATIVE
INFLUENZA B ANTIGEN, POC: NEGATIVE
LOT EXPIRE DATE: NORMAL
LOT KIT NUMBER: NORMAL
SARS-COV-2, POC: NORMAL
VALID INTERNAL CONTROL: NORMAL
VENDOR AND KIT NAME POC: NORMAL

## 2024-09-23 PROCEDURE — 99213 OFFICE O/P EST LOW 20 MIN: CPT

## 2024-09-23 PROCEDURE — 87428 SARSCOV & INF VIR A&B AG IA: CPT

## 2024-09-23 RX ORDER — AZITHROMYCIN 250 MG/1
TABLET, FILM COATED ORAL
Qty: 6 TABLET | Refills: 0 | Status: SHIPPED | OUTPATIENT
Start: 2024-09-23

## 2024-12-08 DIAGNOSIS — J32.4 CHRONIC PANSINUSITIS: ICD-10-CM

## 2024-12-09 RX ORDER — MONTELUKAST SODIUM 10 MG/1
10 TABLET ORAL NIGHTLY
Qty: 90 TABLET | Refills: 1 | Status: SHIPPED | OUTPATIENT
Start: 2024-12-09

## 2024-12-09 NOTE — TELEPHONE ENCOUNTER
Name of Medication(s) Requested:  Requested Prescriptions     Pending Prescriptions Disp Refills    montelukast (SINGULAIR) 10 MG tablet [Pharmacy Med Name: MONTELUKAST SOD 10 MG TABLET] 90 tablet 1     Sig: TAKE 1 TABLET BY MOUTH EVERY DAY AT NIGHT       Medication is on current medication list Yes    Dosage and directions were verified? Yes    Quantity verified: 90 day supply     Pharmacy Verified?  Yes    Last Appointment:  8/1/2024    Future appts:  No future appointments.     (If no appt send self scheduling link. .REFILLAPPT)  Scheduling request sent?     [] Yes  [x] No    Does patient need updated?  [] Yes  [x] No

## 2025-01-24 ENCOUNTER — TELEPHONE (OUTPATIENT)
Dept: PRIMARY CARE CLINIC | Age: 55
End: 2025-01-24

## 2025-01-24 ENCOUNTER — OFFICE VISIT (OUTPATIENT)
Dept: FAMILY MEDICINE CLINIC | Age: 55
End: 2025-01-24

## 2025-01-24 VITALS
WEIGHT: 129 LBS | OXYGEN SATURATION: 97 % | DIASTOLIC BLOOD PRESSURE: 80 MMHG | SYSTOLIC BLOOD PRESSURE: 116 MMHG | TEMPERATURE: 97.3 F | HEART RATE: 65 BPM | BODY MASS INDEX: 19.61 KG/M2

## 2025-01-24 DIAGNOSIS — S02.2XXA CLOSED FRACTURE OF NASAL BONE, INITIAL ENCOUNTER: ICD-10-CM

## 2025-01-24 DIAGNOSIS — T14.90XA TRAUMA: Primary | ICD-10-CM

## 2025-01-24 RX ORDER — METHYLPREDNISOLONE 4 MG/1
TABLET ORAL
Qty: 1 KIT | Refills: 0 | Status: SHIPPED | OUTPATIENT
Start: 2025-01-24

## 2025-01-24 ASSESSMENT — ENCOUNTER SYMPTOMS
EYES NEGATIVE: 1
GASTROINTESTINAL NEGATIVE: 1
SORE THROAT: 0
FACIAL SWELLING: 1
RESPIRATORY NEGATIVE: 1
TROUBLE SWALLOWING: 0

## 2025-01-24 NOTE — TELEPHONE ENCOUNTER
Pt called today, asking for xrays to be put in. Pt fell last night on ice, hit her head and face. Pt said she is ok overall, but concerned. Advised pt to go to  Walkin w/imaging, but pr preferred to ask CE. Pls advise.

## 2025-01-24 NOTE — PROGRESS NOTES
Anneliese Saeed (:  1970) is a 54 y.o. female,Established patient, here for evaluation of the following chief complaint(s):  Facial Injury (This happened last night and pt is sinus problems and is having a hard time breathing ) and Headache         Assessment & Plan  Trauma     Orders:    XR NASAL BONE (MIN 3 VIEWS ); Future    XR SINUSES (<3 VIEWS); Future    Closed fracture of nasal bone, initial encounter  Suspected nasal bone fracture.  Will treat symptomatically and refer to otolaryngology for further evaluation.  Short course of steroid sent over to help with the swelling and difficulty with breathing secondary to the injury.  Patient advised regarding red flag signs or symptoms and conservative therapy.  Voiced understanding.    Orders:    Theresa Stratton DO, Otolaryngology, [place]      No follow-ups on file.       Subjective   HPI  Patient presents today for evaluation of facial injury and nasal bone pain.  Patient states that she was out with her dogs in the snow when she slipped on ice and fell hitting the right side of her forehead and nose into the side of her house.  Since that time there has been an increase in pain and swelling in addition to difficulty breathing.  No loss of consciousness.  No current headache.  No neurological symptoms.    Review of Systems   Constitutional:  Negative for fever.   HENT:  Positive for congestion, facial swelling and postnasal drip. Negative for sore throat and trouble swallowing.    Eyes: Negative.    Respiratory: Negative.     Cardiovascular: Negative.    Gastrointestinal: Negative.    Musculoskeletal:  Negative for neck pain.   Skin:  Negative for rash.   Neurological:  Negative for headaches.   Hematological:  Negative for adenopathy.   All other systems reviewed and are negative.         Current Outpatient Medications:     methylPREDNISolone (MEDROL DOSEPACK) 4 MG tablet, Take by mouth., Disp: 1 kit, Rfl: 0    montelukast (SINGULAIR) 10 MG

## 2025-02-13 ENCOUNTER — OFFICE VISIT (OUTPATIENT)
Dept: ENT CLINIC | Age: 55
End: 2025-02-13
Payer: COMMERCIAL

## 2025-02-13 VITALS
SYSTOLIC BLOOD PRESSURE: 132 MMHG | WEIGHT: 131.2 LBS | HEART RATE: 86 BPM | OXYGEN SATURATION: 96 % | BODY MASS INDEX: 19.88 KG/M2 | DIASTOLIC BLOOD PRESSURE: 91 MMHG | HEIGHT: 68 IN

## 2025-02-13 DIAGNOSIS — R09.81 NASAL CONGESTION: ICD-10-CM

## 2025-02-13 DIAGNOSIS — S02.2XXA CLOSED FRACTURE OF NASAL BONE, INITIAL ENCOUNTER: Primary | ICD-10-CM

## 2025-02-13 PROCEDURE — 99204 OFFICE O/P NEW MOD 45 MIN: CPT | Performed by: OTOLARYNGOLOGY

## 2025-02-13 RX ORDER — PREDNISONE 20 MG/1
40 TABLET ORAL DAILY
Qty: 10 TABLET | Refills: 0 | Status: SHIPPED | OUTPATIENT
Start: 2025-02-13 | End: 2025-02-18

## 2025-02-13 NOTE — PROGRESS NOTES
Department of Otolaryngology  Office Consult Note  2/13/25          Subjective:        Chief Complaint:  had concerns including New Patient (New patient referred by Dr. Grigsby for nasal bone fracture).     Patient ID: Anneliese Saeed is a 54 y.o. female.    HPI: Patient presents as  new patient for possible nasal bone fracture. Patient reports about 3 weeks ago had mechanical trip and fall onto the nose on icy ground. Had laceration and only mild bleeding with some pain and pressure. Had xray done that showed no fracture  Sh is still having hard time breathing out both sides L >R and feels her nasal bones are deformed or crooked   Having more congestion since, she is still on flonase daily and jarrod med rinse    Had hx of septoplasty and FESS in [facility] for chronic sinusis     Review of Systems   Constitutional: Negative.    HENT:  Negative for congestion, ear discharge, ear pain, hearing loss, rhinorrhea, sneezing and sore throat.    Respiratory: Negative.     Cardiovascular:  Negative for chest pain.   Musculoskeletal: Negative.    Skin: Negative.    Allergic/Immunologic: Negative.    Neurological: Negative.    Psychiatric/Behavioral: Negative.     All other systems reviewed and are negative.        Past Medical History:   Diagnosis Date    Anal condyloma 09/2020    Internal hemorrhoids 09/16/2020    Other hyperlipidemia 08/05/2024     Past Surgical History:   Procedure Laterality Date    COLONOSCOPY N/A 9/16/2020    COLONOSCOPY DIAGNOSTIC performed by Sabrina Muñoz MD at [facility] ENDOSCOPY    DILATION AND CURETTAGE OF UTERUS  02/14/2013    FOR MISSED AB  9 WEEKS    [facility]OMY  [facility]/16/2018    [facility]    MAXILLARY SINUSOTOMY  10/16/2018    [facility]    SEPTOPLASTY  10/16/2018    [facility]    TONSILLECTOMY         Current Outpatient Medications:     predniSONE (DELTASONE) 20 MG tablet, Take 2 tablets by mouth daily for 5 days, Disp: 10 tablet, Rfl: 0    montelukast (SINGULAIR) 10 MG tablet, TAKE 1 TABLET BY MOUTH EVERY

## 2025-02-14 ENCOUNTER — HOSPITAL ENCOUNTER (OUTPATIENT)
Dept: CT IMAGING | Age: 55
Discharge: HOME | End: 2025-02-16
Attending: OTOLARYNGOLOGY
Payer: COMMERCIAL

## 2025-02-14 ENCOUNTER — TELEPHONE (OUTPATIENT)
Dept: ENT CLINIC | Age: 55
End: 2025-02-14

## 2025-02-14 DIAGNOSIS — S02.2XXA CLOSED FRACTURE OF NASAL BONE, INITIAL ENCOUNTER: ICD-10-CM

## 2025-02-14 PROCEDURE — 70486 CT MAXILLOFACIAL W/O DYE: CPT

## 2025-02-14 NOTE — TELEPHONE ENCOUNTER
Discussed CT facial bones reviewed personally by me and with the patient, no evidence of acute fracture, nasal bones are nondisplaced straight and in line.  No other acute fractures noted.  Discussed with patient some of her cosmetic deformity could be due to soft tissue swelling.  Recommend finishing the prednisone as well as Moderma scar cream and aggressive massage to the nasal dorsum.  If symptoms of external deformity persist, she will follow-up with her plastic surgeon.

## 2025-02-17 ASSESSMENT — ENCOUNTER SYMPTOMS
RESPIRATORY NEGATIVE: 1
RHINORRHEA: 0
ALLERGIC/IMMUNOLOGIC NEGATIVE: 1
SORE THROAT: 0

## 2025-04-02 SDOH — ECONOMIC STABILITY: INCOME INSECURITY: IN THE LAST 12 MONTHS, WAS THERE A TIME WHEN YOU WERE NOT ABLE TO PAY THE MORTGAGE OR RENT ON TIME?: NO

## 2025-04-02 SDOH — ECONOMIC STABILITY: FOOD INSECURITY: WITHIN THE PAST 12 MONTHS, THE FOOD YOU BOUGHT JUST DIDN'T LAST AND YOU DIDN'T HAVE MONEY TO GET MORE.: SOMETIMES TRUE

## 2025-04-02 SDOH — ECONOMIC STABILITY: FOOD INSECURITY: WITHIN THE PAST 12 MONTHS, YOU WORRIED THAT YOUR FOOD WOULD RUN OUT BEFORE YOU GOT MONEY TO BUY MORE.: SOMETIMES TRUE

## 2025-04-02 SDOH — ECONOMIC STABILITY: TRANSPORTATION INSECURITY
IN THE PAST 12 MONTHS, HAS THE LACK OF TRANSPORTATION KEPT YOU FROM MEDICAL APPOINTMENTS OR FROM GETTING MEDICATIONS?: NO

## 2025-04-02 ASSESSMENT — PATIENT HEALTH QUESTIONNAIRE - PHQ9
2. FEELING DOWN, DEPRESSED OR HOPELESS: NOT AT ALL
SUM OF ALL RESPONSES TO PHQ9 QUESTIONS 1 & 2: 0
SUM OF ALL RESPONSES TO PHQ QUESTIONS 1-9: 0
1. LITTLE INTEREST OR PLEASURE IN DOING THINGS: NOT AT ALL
SUM OF ALL RESPONSES TO PHQ QUESTIONS 1-9: 0
1. LITTLE INTEREST OR PLEASURE IN DOING THINGS: NOT AT ALL
2. FEELING DOWN, DEPRESSED OR HOPELESS: NOT AT ALL

## 2025-04-03 ENCOUNTER — OFFICE VISIT (OUTPATIENT)
Dept: PRIMARY CARE CLINIC | Age: 55
End: 2025-04-03
Payer: COMMERCIAL

## 2025-04-03 VITALS
WEIGHT: 127 LBS | OXYGEN SATURATION: 99 % | DIASTOLIC BLOOD PRESSURE: 84 MMHG | TEMPERATURE: 98.1 F | BODY MASS INDEX: 19.25 KG/M2 | HEART RATE: 67 BPM | SYSTOLIC BLOOD PRESSURE: 122 MMHG | HEIGHT: 68 IN | RESPIRATION RATE: 16 BRPM

## 2025-04-03 DIAGNOSIS — L30.9 DERMATITIS: Primary | ICD-10-CM

## 2025-04-03 PROCEDURE — 99213 OFFICE O/P EST LOW 20 MIN: CPT | Performed by: FAMILY MEDICINE

## 2025-04-03 RX ORDER — CLOBETASOL PROPIONATE 0.5 MG/G
CREAM TOPICAL
Qty: 30 G | Refills: 0 | Status: SHIPPED | OUTPATIENT
Start: 2025-04-03

## 2025-04-03 RX ORDER — AMOXICILLIN 500 MG/1
2000 CAPSULE ORAL
COMMUNITY
Start: 2025-03-11

## 2025-04-03 ASSESSMENT — ENCOUNTER SYMPTOMS
SORE THROAT: 0
DIARRHEA: 0

## 2025-04-03 NOTE — PROGRESS NOTES
Anneliese Saeed, a female of 55 y.o. came to the office 4/3/2025.     Patient Active Problem List   Diagnosis    Cold sore    Acute bacterial sinusitis    Other hyperlipidemia          Rash  This is a new problem. The current episode started 1 to 4 weeks ago (3-4). The problem has been waxing and waning since onset. The affected locations include the right ankle. The rash is characterized by itchiness and redness. Pertinent negatives include no diarrhea, fever, joint pain or sore throat. Past treatments include topical steroids (triamcinolone cr and otc steroid cr.). The treatment provided no relief.        Allergies   Allergen Reactions    Flagyl [Metronidazole] Hives     Itchy lips swollen    Levaquin [Levofloxacin In D5w] Other (See Comments)     Nightmares       Current Outpatient Medications on File Prior to Visit   Medication Sig Dispense Refill    amoxicillin (AMOXIL) 500 MG capsule Take 4 capsules by mouth 4 caps prior to dental appt      montelukast (SINGULAIR) 10 MG tablet TAKE 1 TABLET BY MOUTH EVERY DAY AT NIGHT 90 tablet 1    fluticasone (FLONASE) 50 MCG/ACT nasal spray SPRAY 2 SPRAYS INTO EACH NOSTRIL EVERY DAY 48 g 1    FOLIC ACID PO Take by mouth daily      ZINC PO Take by mouth daily      Ascorbic Acid (VITAMIN C PO) Take by mouth daily      VITAMIN D PO Take by mouth daily      Omega-3 Fatty Acids (FISH OIL) 1000 MG CAPS Take 1 capsule by mouth      Multiple Vitamins-Minerals (MULTIVITAMIN ADULT PO) Take 1 capsule by mouth      methylPREDNISolone (MEDROL DOSEPACK) 4 MG tablet Take by mouth. 1 kit 0     Current Facility-Administered Medications on File Prior to Visit   Medication Dose Route Frequency Provider Last Rate Last Admin    lidocaine-EPINEPHrine 1 percent-[id number] injection 5 mL  5 mL IntraDERmal Once Benjamin-Sabrina Santos MD           Review of Systems   Constitutional:  Negative for fever.   HENT:  Negative for sore throat.    Gastrointestinal:  Negative for diarrhea.

## 2025-06-05 DIAGNOSIS — J32.4 CHRONIC PANSINUSITIS: ICD-10-CM

## 2025-06-05 RX ORDER — FLUTICASONE PROPIONATE 50 MCG
2 SPRAY, SUSPENSION (ML) NASAL DAILY
Qty: 48 ML | Refills: 1 | Status: SHIPPED | OUTPATIENT
Start: 2025-06-05

## 2025-06-05 NOTE — TELEPHONE ENCOUNTER
Patients last appointment 4/3/2025.  Patients next scheduled appointment No future appointments.

## (undated) DEVICE — GRADUATE TRIANG MEASURE 1000ML BLK PRNT

## (undated) DEVICE — SPONGE GZ W4XL4IN RAYON POLY FILL CVR W/ NONWOVEN FAB